# Patient Record
Sex: FEMALE | Race: WHITE | NOT HISPANIC OR LATINO
[De-identification: names, ages, dates, MRNs, and addresses within clinical notes are randomized per-mention and may not be internally consistent; named-entity substitution may affect disease eponyms.]

---

## 2020-06-01 ENCOUNTER — APPOINTMENT (OUTPATIENT)
Dept: OBGYN | Facility: CLINIC | Age: 20
End: 2020-06-01

## 2021-02-21 ENCOUNTER — APPOINTMENT (OUTPATIENT)
Dept: OBGYN | Facility: CLINIC | Age: 21
End: 2021-02-21
Payer: COMMERCIAL

## 2021-02-21 VITALS
TEMPERATURE: 96.7 F | BODY MASS INDEX: 33.9 KG/M2 | DIASTOLIC BLOOD PRESSURE: 71 MMHG | HEART RATE: 81 BPM | WEIGHT: 216 LBS | SYSTOLIC BLOOD PRESSURE: 115 MMHG | HEIGHT: 67 IN

## 2021-02-21 DIAGNOSIS — Z01.419 ENCOUNTER FOR GYNECOLOGICAL EXAMINATION (GENERAL) (ROUTINE) W/OUT ABNORMAL FINDINGS: ICD-10-CM

## 2021-02-21 DIAGNOSIS — Z78.9 OTHER SPECIFIED HEALTH STATUS: ICD-10-CM

## 2021-02-21 PROCEDURE — 99072 ADDL SUPL MATRL&STAF TM PHE: CPT

## 2021-02-21 PROCEDURE — 99385 PREV VISIT NEW AGE 18-39: CPT

## 2021-02-23 LAB — CYTOLOGY CVX/VAG DOC THIN PREP: NORMAL

## 2023-01-18 ENCOUNTER — INPATIENT (INPATIENT)
Facility: HOSPITAL | Age: 23
LOS: 3 days | Discharge: HOME | DRG: 123 | End: 2023-01-22
Attending: PSYCHIATRY & NEUROLOGY | Admitting: PSYCHIATRY & NEUROLOGY
Payer: COMMERCIAL

## 2023-01-18 VITALS
WEIGHT: 218.92 LBS | HEART RATE: 87 BPM | HEIGHT: 67 IN | SYSTOLIC BLOOD PRESSURE: 125 MMHG | TEMPERATURE: 99 F | OXYGEN SATURATION: 98 % | RESPIRATION RATE: 20 BRPM | DIASTOLIC BLOOD PRESSURE: 83 MMHG

## 2023-01-18 DIAGNOSIS — H46.9 UNSPECIFIED OPTIC NEURITIS: ICD-10-CM

## 2023-01-18 LAB
ALBUMIN SERPL ELPH-MCNC: 4.9 G/DL — SIGNIFICANT CHANGE UP (ref 3.3–5)
ALP SERPL-CCNC: 71 U/L — SIGNIFICANT CHANGE UP (ref 40–120)
ALT FLD-CCNC: 73 U/L — HIGH (ref 10–45)
ANION GAP SERPL CALC-SCNC: 12 MMOL/L — SIGNIFICANT CHANGE UP (ref 5–17)
APPEARANCE UR: CLEAR — SIGNIFICANT CHANGE UP
APTT BLD: 32.1 SEC — SIGNIFICANT CHANGE UP (ref 27.5–35.5)
AST SERPL-CCNC: 43 U/L — HIGH (ref 10–40)
BACTERIA # UR AUTO: NEGATIVE — SIGNIFICANT CHANGE UP
BASOPHILS # BLD AUTO: 0.02 K/UL — SIGNIFICANT CHANGE UP (ref 0–0.2)
BASOPHILS NFR BLD AUTO: 0.3 % — SIGNIFICANT CHANGE UP (ref 0–2)
BILIRUB SERPL-MCNC: 0.3 MG/DL — SIGNIFICANT CHANGE UP (ref 0.2–1.2)
BILIRUB UR-MCNC: NEGATIVE — SIGNIFICANT CHANGE UP
BUN SERPL-MCNC: 11 MG/DL — SIGNIFICANT CHANGE UP (ref 7–23)
CALCIUM SERPL-MCNC: 10.1 MG/DL — SIGNIFICANT CHANGE UP (ref 8.4–10.5)
CHLORIDE SERPL-SCNC: 100 MMOL/L — SIGNIFICANT CHANGE UP (ref 96–108)
CO2 SERPL-SCNC: 26 MMOL/L — SIGNIFICANT CHANGE UP (ref 22–31)
COLOR SPEC: SIGNIFICANT CHANGE UP
CREAT SERPL-MCNC: 0.72 MG/DL — SIGNIFICANT CHANGE UP (ref 0.5–1.3)
DIFF PNL FLD: ABNORMAL
EGFR: 120 ML/MIN/1.73M2 — SIGNIFICANT CHANGE UP
EOSINOPHIL # BLD AUTO: 0.06 K/UL — SIGNIFICANT CHANGE UP (ref 0–0.5)
EOSINOPHIL NFR BLD AUTO: 0.9 % — SIGNIFICANT CHANGE UP (ref 0–6)
EPI CELLS # UR: 4 /HPF — SIGNIFICANT CHANGE UP
GLUCOSE SERPL-MCNC: 79 MG/DL — SIGNIFICANT CHANGE UP (ref 70–99)
GLUCOSE UR QL: NEGATIVE — SIGNIFICANT CHANGE UP
HCT VFR BLD CALC: 41.1 % — SIGNIFICANT CHANGE UP (ref 34.5–45)
HGB BLD-MCNC: 13.6 G/DL — SIGNIFICANT CHANGE UP (ref 11.5–15.5)
HYALINE CASTS # UR AUTO: 1 /LPF — SIGNIFICANT CHANGE UP (ref 0–2)
IMM GRANULOCYTES NFR BLD AUTO: 0.3 % — SIGNIFICANT CHANGE UP (ref 0–0.9)
INR BLD: 1.13 RATIO — SIGNIFICANT CHANGE UP (ref 0.88–1.16)
KETONES UR-MCNC: SIGNIFICANT CHANGE UP
LEUKOCYTE ESTERASE UR-ACNC: NEGATIVE — SIGNIFICANT CHANGE UP
LYMPHOCYTES # BLD AUTO: 3.63 K/UL — HIGH (ref 1–3.3)
LYMPHOCYTES # BLD AUTO: 52.8 % — HIGH (ref 13–44)
MCHC RBC-ENTMCNC: 30.6 PG — SIGNIFICANT CHANGE UP (ref 27–34)
MCHC RBC-ENTMCNC: 33.1 GM/DL — SIGNIFICANT CHANGE UP (ref 32–36)
MCV RBC AUTO: 92.4 FL — SIGNIFICANT CHANGE UP (ref 80–100)
MONOCYTES # BLD AUTO: 0.29 K/UL — SIGNIFICANT CHANGE UP (ref 0–0.9)
MONOCYTES NFR BLD AUTO: 4.2 % — SIGNIFICANT CHANGE UP (ref 2–14)
NEUTROPHILS # BLD AUTO: 2.85 K/UL — SIGNIFICANT CHANGE UP (ref 1.8–7.4)
NEUTROPHILS NFR BLD AUTO: 41.5 % — LOW (ref 43–77)
NITRITE UR-MCNC: NEGATIVE — SIGNIFICANT CHANGE UP
NRBC # BLD: 0 /100 WBCS — SIGNIFICANT CHANGE UP (ref 0–0)
PH UR: 5.5 — SIGNIFICANT CHANGE UP (ref 5–8)
PLATELET # BLD AUTO: 254 K/UL — SIGNIFICANT CHANGE UP (ref 150–400)
POTASSIUM SERPL-MCNC: 3.9 MMOL/L — SIGNIFICANT CHANGE UP (ref 3.5–5.3)
POTASSIUM SERPL-SCNC: 3.9 MMOL/L — SIGNIFICANT CHANGE UP (ref 3.5–5.3)
PROT SERPL-MCNC: 7.9 G/DL — SIGNIFICANT CHANGE UP (ref 6–8.3)
PROT UR-MCNC: NEGATIVE — SIGNIFICANT CHANGE UP
PROTHROM AB SERPL-ACNC: 13.1 SEC — SIGNIFICANT CHANGE UP (ref 10.5–13.4)
RAPID RVP RESULT: SIGNIFICANT CHANGE UP
RBC # BLD: 4.45 M/UL — SIGNIFICANT CHANGE UP (ref 3.8–5.2)
RBC # FLD: 11.6 % — SIGNIFICANT CHANGE UP (ref 10.3–14.5)
RBC CASTS # UR COMP ASSIST: 7 /HPF — HIGH (ref 0–4)
SARS-COV-2 RNA SPEC QL NAA+PROBE: SIGNIFICANT CHANGE UP
SODIUM SERPL-SCNC: 138 MMOL/L — SIGNIFICANT CHANGE UP (ref 135–145)
SP GR SPEC: 1.01 — SIGNIFICANT CHANGE UP (ref 1.01–1.02)
UROBILINOGEN FLD QL: NEGATIVE — SIGNIFICANT CHANGE UP
WBC # BLD: 6.87 K/UL — SIGNIFICANT CHANGE UP (ref 3.8–10.5)
WBC # FLD AUTO: 6.87 K/UL — SIGNIFICANT CHANGE UP (ref 3.8–10.5)
WBC UR QL: 1 /HPF — SIGNIFICANT CHANGE UP (ref 0–5)

## 2023-01-18 PROCEDURE — 70450 CT HEAD/BRAIN W/O DYE: CPT | Mod: 26,MA

## 2023-01-18 PROCEDURE — 71045 X-RAY EXAM CHEST 1 VIEW: CPT | Mod: 26

## 2023-01-18 PROCEDURE — 99285 EMERGENCY DEPT VISIT HI MDM: CPT

## 2023-01-18 RX ORDER — PANTOPRAZOLE SODIUM 20 MG/1
40 TABLET, DELAYED RELEASE ORAL
Refills: 0 | Status: DISCONTINUED | OUTPATIENT
Start: 2023-01-18 | End: 2023-01-22

## 2023-01-18 RX ADMIN — Medication 58 MILLIGRAM(S): at 23:24

## 2023-01-18 NOTE — ED PROVIDER NOTE - RAPID ASSESSMENT
22y F was sent in by ophthalmologist (Dr. Neri Patiño) for optic neuritis diagnosed today via MRI. Reports having L eye blurriness and intermittent HA since January 1st. Of note pt had covid at the end of December. Pt is well appearing in triage.     Helena RAMÍREZ (Nithya) have documented this rapid assessment note under the dictation of Savage Chandler) which has been reviewed and affirmed to be accurate. Patient was seen as a QDOC patient. The patient will be seen and further worked up in the main emergency department and their care will be completed by the main emergency department team along with a thorough physical exam. Receiving team will follow up on labs, analgesia, any clinical imaging, reassess and disposition as clinically indicated, all decisions regarding the progression of care will be made at their discretion. 22y F was sent in by ophthalmologist (Dr. Neri Patiño) for optic neuritis diagnosed today via MRI. Reports having L eye blurriness and intermittent HA since January 1st. Of note pt had covid at the end of December. Pt is well appearing in triage.     Helena RAMÍREZ (Scribe) have documented this rapid assessment note under the dictation of Savage Chandler (MD) which has been reviewed and affirmed to be accurate. Patient was seen as a QDOC patient. The patient will be seen and further worked up in the main emergency department and their care will be completed by the main emergency department team along with a thorough physical exam. Receiving team will follow up on labs, analgesia, any clinical imaging, reassess and disposition as clinically indicated, all decisions regarding the progression of care will be made at their discretion.    PT seen as Triag/Qdoc with scribe full evaluation to be performed once pt is transferred to main ED  Savage Chandler MD, Facep

## 2023-01-18 NOTE — ED ADULT TRIAGE NOTE - CHIEF COMPLAINT QUOTE
sent by ophthalmologist for optic neuritis diagnosed today via MRI. Pt states "I have L eye blurriness since I had covid end of December." Also with intermittent headache

## 2023-01-18 NOTE — H&P ADULT - ASSESSMENT
Impression:   Left eye pain with decreased vision 2/2 to Optic Neuritis  recommendation:   Neurology   IV solumedrol 1g Qday for 5 days   Optho consulted in the ED   LP in the AM   CSF studies as per in inflammatory, Autoimmune etiology   Neuro checks q4hrs     Pulm   on Ra     Cardio  keep normoteksive     GI   regular diet   Protonix 40mg qday     Heme  monitor CBC  SCDS    Renal   monitor bmp      Endo  TSH, hgba 1c  monitor BG- as patient is on IV steriods     ID   no acute issues     Case discussed with Dr. Milton. Case to be seen with Dr. Woo.

## 2023-01-18 NOTE — ED PROVIDER NOTE - PROGRESS NOTE DETAILS
Vinny, PGY4 - ophtho & neuro consulted, rory shipley pt. Neuro agrees to starting pt on Solumedrol now. MRI Brain and orbits with and without IV contrast performed today 1/18/23 confirms abnormal signal and enhancement within the left optic nerve concerning for optic neuritis. Ophtho Dr. Neri Patiño. Pt does not know her PCP's name Vinny, PGY4 - ophtho & neuro consulted, rory shipley pt. Neuro agrees to starting pt on Solumedrol now. MRI Brain and orbits with and without IV contrast performed today 1/18/23 confirms abnormal signal and enhancement within the left optic nerve concerning for optic neuritis. Ophtho Dr. Neri Patiño. Pt does not know her PCP's name    External appearance: OD no discharge or conjunctival injection; OS no discharge or conjunctival injection  Pupils: OD round and reactive; OS round and reactive  Extraocular movements: OD intact; OS intact  Visual Fields: OD intact x 4; OS L superior visual field defect  Visual Acuity: OD 20/20; OS 20/40  Lids/Lashes/Lacrimal: OD no lesions; OS no lesions  Cornea: OD no fluorescein uptake; OS no fluorescein uptake  Conjunctiva & Sclera: OD white; OS white  Intraocular pressure: OD 13 ; OS 11 Vinny, PGY4 - pt seen by neuro, states can start solumedrol and admit to neuro service at this time. LP to be performed inpatient tomorrow AM

## 2023-01-18 NOTE — H&P ADULT - ATTENDING COMMENTS
Seen and examined with residents    22f   developed blurred vision OS after COVID  some pain  saw ophtho and had an MRI showing enhancement on left ON (by report)  s/p steroids 2 doses  vision stable  no other neurologic symptoms  history of "brain lesion" on prior scan by report    On exam she has pharmacologically dilated pupils (by ophto exam here there is RAPD on left but no disc edema)  visual fields with decreased inferior nasal field OS  otherwise normal exam    likely ON  contine steroids x5d then oral taper  MRI C and T spine  serologies including NMO/MOG  hold on LP for now  asked her to get her MRI brain from yesterday or will need to repeat

## 2023-01-18 NOTE — ED PROVIDER NOTE - CLINICAL SUMMARY MEDICAL DECISION MAKING FREE TEXT BOX
Attending note.  Patient with optic neuritis found on MRI this morning and with complaint of left eye pain and blurred vision since January 1.  Ophthalmology consultation, neurology consultation and possible steroid infusion.

## 2023-01-18 NOTE — ED ADULT NURSE NOTE - OBJECTIVE STATEMENT
22y female A&OX4 coming in through triage complaining of optic neuritis. No PMHX. Pt repots having Left eye blurry vision on 1/1/23 with pressure to eye. Pt follow PCP and was recommend to get MRI and pt went to MRI this morning and was told this afternoon to go to the ER for follow up. Pt denies eye pressure, headache, dizziness, chest pain, shortness of breath, abd pain, fever, cough, N/V/D. Labs was drawn and sent to lab. Pt awaiting ct scan. Pt pending dispo.

## 2023-01-18 NOTE — ED PROVIDER NOTE - OBJECTIVE STATEMENT
Attending note.  Patient was seen in blue eye room.  Patient had an MRI performed this morning which showed optic neuritis in the left eye.  Patient became sick with COVID on December 26 with sore throat nasal congestion cough and fever which lasted a few days.  Patient developed blurred vision in the left eye on January 1 with mild left-sided headache and pain around the left eye.  Patient was seen by ophthalmology on January 12.  Patient had MRI today and was called with report.  She denies any other neurologic symptoms.  She reports no significant past medical history, takes no medications and has no allergies.  She does not wear corrective lenses.

## 2023-01-18 NOTE — ED PROVIDER NOTE - PHYSICAL EXAMINATION
Attending note.  Patient is alert and in no acute distress.  Pupils are 3 mm equal and reactive.  There is no APD.  Extraocular muscles are intact.  Neurologic exam including cranial nerves, motor, sensory, cerebellar and speech are intact and nonfocal.

## 2023-01-18 NOTE — H&P ADULT - NSHPLABSRESULTS_GEN_ALL_CORE
Labs:   cbc                      13.6   6.87  )-----------( 254      ( 2023 20:51 )             41.1     [id number]    138  |  100  |  11  ----------------------------<  79  3.9   |  26  |  0.72    Ca    10.1      2023 20:51    TPro  7.9  /  Alb  4.9  /  TBili  0.3  /  DBili  x   /  AST  43<H>  /  ALT  73<H>  /  AlkPhos  71  01-18    CoagsPT/INR - ( 2023 20:51 )   PT: 13.1 sec;   INR: 1.13 ratio         PTT - ( 2023 20:51 )  PTT:32.1 sec    LFTsLIVER FUNCTIONS - ( 2023 20:51 )  Alb: 4.9 g/dL / Pro: 7.9 g/dL / ALK PHOS: 71 U/L / ALT: 73 U/L / AST: 43 U/L / GGT: x           UAUrinalysis Basic - ( 2023 21:25 )    Color: Light Yellow / Appearance: Clear / S.010 / pH: x  Gluc: x / Ketone: Trace  / Bili: Negative / Urobili: Negative   Blood: x / Protein: Negative / Nitrite: Negative   Leuk Esterase: Negative / RBC: 7 /hpf / WBC 1 /HPF   Sq Epi: x / Non Sq Epi: 4 /hpf / Bacteria: Negative        Radiology:  MRI head and orbits:   MRI brain and orbits results stating abnormal enhancement of the left optic nerve in the intracanicular and intracranial segments of the nerve  0.6cm left parietal lobe lesion..

## 2023-01-18 NOTE — H&P ADULT - NSHPPHYSICALEXAM_GEN_ALL_CORE
Vital Signs Last 24 Hrs  T(C): 36.7 (18 Jan 2023 20:52), Max: 37.1 (18 Jan 2023 16:46)  T(F): 98.1 (18 Jan 2023 20:52), Max: 98.7 (18 Jan 2023 16:46)  HR: 93 (18 Jan 2023 20:52) (87 - 93)  BP: 123/77 (18 Jan 2023 20:52) (123/77 - 125/83)  BP(mean): --  RR: 18 (18 Jan 2023 20:52) (18 - 20)  SpO2: 97% (18 Jan 2023 20:52) (97% - 98%)    Parameters below as of 18 Jan 2023 20:52  Patient On (Oxygen Delivery Method): room air        GENERAL EXAM:  Constitutional: awake and alert. NAD  HEENT: PERRLA, EOMI  Neck: Supple  Respiratory: Breath sounds are clear bilaterally  Cardiovascular: S1 and S2, regular / irregular rhythm  Gastrointestinal: soft, nontender  Extremities: no edema, no cyanosis  Vascular: no carotid bruits  Musculoskeletal: no joint swelling/tenderness, no abnormal movements  Skin: no rashes    NEUROLOGICAL EXAM:  MS: AAOX3, fluent, attends b/l; recent and remote memory intact; normal attention, language and fund of knowledge.   CN: EOMI, PERRL,. Decreased vison in the LEFT- pain on movement. 20/50. Red color desaturation abnormalities noted.     V1-3 intact, no facial asymmetry, t/p midline,   Motor: Strength: 5/5 4x.   Tone: normal. Bulk: normal.   DTR 2+ symm.    Plantar flex b/l.   Sensation: intact  4x.   Coordination: intact 4x.   Gait:  Romberg negative, pull test negative; walks with narrow base, pivots in 2 steps.

## 2023-01-18 NOTE — H&P ADULT - HISTORY OF PRESENT ILLNESS
MRN-[id number]  Patient is a 22y old  Female who presents with a chief complaint of   HPI:  Patient is 23yo F with  pmh  of  hx of covid, headaches presents to ED for optic neuritis treatment. Patient was sent in by her Ophthalmologist Dr. Neri Patiño upon MRI brain and orbits results stating abnormal enhancement of the left optic nerve in the intracanicular and intracranial segments of the nerve. Patient states the symptoms started around 1/1/2023 with pain on moving her left eye. Patient states there is decreased vision in the Left eye as well. Patient states its like a thin film covering her left eye. Patient of note saw a Neurologist last year Dr. Graff, for tremors and was noted to have 0.6cm left parietal lobe lesion. Patient denies fever, chills. numbness and tingling.     PAST MEDICAL & SURGICAL HISTORY:  No pertinent past medical history        FAMILY HISTORY:    Social Hx:  Nonsmoker, no drug or alcohol use    Home Medications:    MEDICATIONS  (STANDING):  pantoprazole    Tablet 40 milliGRAM(s) Oral before breakfast    MEDICATIONS  (PRN):    Allergies  No Known Allergies    Intolerances      REVIEW OF SYSTEMS  General:	  Skin/Breast:	  Ophthalmologic:  ENMT:	  Respiratory and Thorax:	  Cardiovascular:	  Gastrointestinal:	  Genitourinary:	  Musculoskeletal:	  Neurological:	  Psychiatric:	  Hematology/Lymphatics:	  Endocrine:	  Allergic/Immunologic:	    ROS: Pertinent positives in HPI, all other ROS were reviewed and are negative.       X Size Of Lesion In Cm (Optional): 0

## 2023-01-19 LAB
24R-OH-CALCIDIOL SERPL-MCNC: 31 NG/ML — SIGNIFICANT CHANGE UP (ref 30–80)
A1C WITH ESTIMATED AVERAGE GLUCOSE RESULT: 5.4 % — SIGNIFICANT CHANGE UP (ref 4–5.6)
CHOLEST SERPL-MCNC: 253 MG/DL — HIGH
ERYTHROCYTE [SEDIMENTATION RATE] IN BLOOD: 3 MM/HR — SIGNIFICANT CHANGE UP (ref 0–15)
ESTIMATED AVERAGE GLUCOSE: 108 MG/DL — SIGNIFICANT CHANGE UP (ref 68–114)
FOLATE SERPL-MCNC: 7.8 NG/ML — SIGNIFICANT CHANGE UP
HDLC SERPL-MCNC: 35 MG/DL — LOW
LIPID PNL WITH DIRECT LDL SERPL: 204 MG/DL — HIGH
NON HDL CHOLESTEROL: 218 MG/DL — HIGH
T3 SERPL-MCNC: 96 NG/DL — SIGNIFICANT CHANGE UP (ref 80–200)
T4 AB SER-ACNC: 7.9 UG/DL — SIGNIFICANT CHANGE UP (ref 4.6–12)
TRIGL SERPL-MCNC: 66 MG/DL — SIGNIFICANT CHANGE UP
TSH SERPL-MCNC: 0.83 UIU/ML — SIGNIFICANT CHANGE UP (ref 0.27–4.2)
VIT B12 SERPL-MCNC: 692 PG/ML — SIGNIFICANT CHANGE UP (ref 232–1245)

## 2023-01-19 PROCEDURE — 72157 MRI CHEST SPINE W/O & W/DYE: CPT | Mod: 26

## 2023-01-19 PROCEDURE — 72156 MRI NECK SPINE W/O & W/DYE: CPT | Mod: 26

## 2023-01-19 PROCEDURE — 99223 1ST HOSP IP/OBS HIGH 75: CPT

## 2023-01-19 RX ORDER — ENOXAPARIN SODIUM 100 MG/ML
40 INJECTION SUBCUTANEOUS EVERY 24 HOURS
Refills: 0 | Status: DISCONTINUED | OUTPATIENT
Start: 2023-01-19 | End: 2023-01-22

## 2023-01-19 RX ADMIN — PANTOPRAZOLE SODIUM 40 MILLIGRAM(S): 20 TABLET, DELAYED RELEASE ORAL at 06:15

## 2023-01-19 RX ADMIN — Medication 58 MILLIGRAM(S): at 06:14

## 2023-01-19 RX ADMIN — ENOXAPARIN SODIUM 40 MILLIGRAM(S): 100 INJECTION SUBCUTANEOUS at 17:14

## 2023-01-19 NOTE — CONSULT NOTE ADULT - ASSESSMENT
Assessment and Recommendations:  22y female with a past medical history/ocular history of Covid and headaches consulted for optic neuritis treatment, found to be improving on steroids. Patient with vision 20/20 right eye, 20/30 left eye, pupils equal, round and reactive trace left APD, intraocular pressure within normal limits, extraocular movements, and confrontational visual fields, and color full both eyes. Dilated fundus exam within normal limits.     Optic neuritis OS   - Hx, outpatient exam, and MRI very suggestive of optic neuritis   - Staill has reduced vision OS and Trace APD OS on exam today   - Agree with steroids, 1g solumedrol for 3 days and then oral pred 1mg/kg/day for 11 days.    - Agree with LP for CSF studies   - Would need additional imaging for MS diagnosis, MRI cervical and thoracic.     Will discuss with Dr. Montgomery in the AM    Outpatient Follow-up: Patient should follow-up with his/her ophthalmologist or with [facility] Department of Ophthalmology within 1 week of after discharge at:    [address]  [place] [address]  [phone]    Oscar Merritt MD, PGY-2  Also available on [employer] Teams     Assessment and Recommendations:  22y female with a past medical history/ocular history of Covid and headaches consulted for optic neuritis treatment, found to be improving on steroids. Patient with vision 20/20 right eye, 20/30 left eye, pupils equal, round and reactive trace left APD, intraocular pressure within normal limits, extraocular movements, and confrontational visual fields, and color full both eyes. Dilated fundus exam within normal limits.     Optic neuritis OS   - Hx, outpatient exam, and MRI very suggestive of optic neuritis   - Still has reduced vision OS and Trace APD OS on exam today   - Color plates full, slight discomfort with eye movements   - Agree with steroids, 1g solumedrol for 3 days and then oral pred 1mg/kg/day for 11 days.    - Agree with LP for CSF studies   - Would need additional imaging for MS diagnosis, MRI cervical and thoracic.     Will discuss with Dr. Montgomery in the AM    Outpatient Follow-up: Patient should follow-up with his/her ophthalmologist or with [facility] Department of Ophthalmology within 1 week of after discharge at:    [address]  [place] [address]  [phone]    Oscar Merritt MD, PGY-2  Also available on [employer] Teams     Assessment and Recommendations:  22y female with a past medical history/ocular history of Covid and headaches consulted for optic neuritis treatment, found to be improving on steroids. Patient with vision 20/20 right eye, 20/30 left eye, pupils equal, round and reactive trace left APD, intraocular pressure within normal limits, extraocular movements, and confrontational visual fields, and color full both eyes. Dilated fundus exam within normal limits.     Optic neuritis OS   - Hx, outpatient exam, and MRI very suggestive of optic neuritis   - Still has reduced vision OS and Trace APD OS on exam today   - Color plates full, slight discomfort with eye movements   - Agree with steroids, 1g solumedrol for 3 days and then oral pred 1mg/kg/day for 11 days.    - Agree with LP for CSF studies   - Please send Serum and CSF MOG and NMO antibodies  - Appreciate further neurology recs   - Would need additional imaging for MS diagnosis, MRI cervical and thoracic.     Discussed with Dr. Montgomery    Outpatient Follow-up: Patient should follow-up with his/her ophthalmologist or with [facility] Department of Ophthalmology within 1 week of after discharge at:    [address]  [place] [address]  [phone]    Oscar Merritt MD, PGY-2  Also available on [employer] Teams

## 2023-01-19 NOTE — PROGRESS NOTE ADULT - SUBJECTIVE AND OBJECTIVE BOX
[facility] DEPARTMENT OF OPHTHALMOLOGY  ------------------------------------------------------------------------------  Jacobo Marsh MD PGY-3  Pager: [phone], also available on teams  ------------------------------------------------------------------------------    Interval History: No acute events overnight. Pt endorse sl improvement in vision right after IV solumedrol.     MEDICATIONS  (STANDING):  enoxaparin Injectable 40 milliGRAM(s) SubCutaneous every 24 hours  methylPREDNISolone sodium succinate IVPB 1000 milliGRAM(s) IV Intermittent daily  pantoprazole    Tablet 40 milliGRAM(s) Oral before breakfast    MEDICATIONS  (PRN):    VITALS: T(C): 37.1 (01-19-23 @ 15:34)  T(F): 98.7 (01-19-23 @ 15:34), Max: 98.7 (01-19-23 @ 12:46)  HR: 87 (01-19-23 @ 15:34) (73 - 93)  BP: 118/70 (01-19-23 @ 15:34) (98/60 - 123/77)  RR:  (18 - 18)  SpO2:  (96% - 99%)  General: AAO x 3, appropriate mood and affect    Ophthalmology Exam:  Visual acuity (sc): 20/20 OD; 20/30+ OS  Pupils: PERRL OU, Trace APD OS  Extraocular movements (EOMs): Full OU, slight discomfort with EMs, no diplopia  Confrontational Visual Field (CVF): Full OU  Color Plates: 12/12 OS  Red desaturation OS 70% of OD    Pen Light Exam (PLE)  External: Flat OU  Lids/Lashes/Lacrimal Ducts: Flat OU    Sclera/Conjunctiva: W+Q OU  Cornea: Cl OU  Anterior Chamber: D+F OU    Iris: Flat OU  Lens: Cl OU    Fundus Exam: dilated with 1% tropicamide and 2.5% phenylephrine  Approval obtained from primary team for dilation  Patient aware that pupils can remained dilated for at least 4-6 hours  Exam performed with 20D lens    Vitreous: wnl OU  Disc, cup/disc: sharp and pink, 0.5 OU  Macula: wnl OU  Vessels: wnl OU  Periphery: wnl OU    Labs/Imaging:  Outpatient MRI showing " stating abnormal enhancement of the left optic nerve in the intracanicular and intracranial segments of the nerve"     Outpatient Imaging 1/12/23  HVF 24-2: Full OD; OD unreliable patchy sup>inf defects  OCT ON: Full OU

## 2023-01-19 NOTE — PROGRESS NOTE ADULT - ASSESSMENT
22y female with a past medical history/ocular history of Covid and headaches consulted for optic neuritis treatment, found to be improving on steroids. Patient with vision 20/20 right eye, 20/30 left eye, pupils equal, round and reactive trace left APD, intraocular pressure within normal limits, extraocular movements, and confrontational visual fields, and color full both eyes. Dilated fundus exam within normal limits.     #Likely optic neuritis OS   - Hx, outpatient exam, and MRI very suggestive of optic neuritis   - Still has reduced vision OS and Trace APD OS on exam today, stable.   - Color plates full, whoever does have mild red desaturation   - Agree with steroids, 1g solumedrol for 3 days and then oral pred 1mg/kg/day for 11 days.    - Agree with LP for CSF studies   - Please send Serum and CSF MOG and NMO antibodies  - Appreciate further neurology recs   - Would need additional imaging for MS diagnosis, MRI cervical and thoracic.     Discussed with Dr. Montgomery    Outpatient Follow-up: Patient should follow-up with his/her ophthalmologist or with [facility] Department of Ophthalmology within 1 week of after discharge at:    [address]  [place] [address]  [phone]

## 2023-01-19 NOTE — CONSULT NOTE ADULT - SUBJECTIVE AND OBJECTIVE BOX
[facility] DEPARTMENT OF OPHTHALMOLOGY - INITIAL ADULT CONSULT  -----------------------------------------------------------------------------------------------------------------  Oscar Merritt MD  PGY 2  Contact on Teams  -----------------------------------------------------------------------------------------------------------------    HPI:  [id number]  Patient is a 22y old  Female who presents with a chief complaint of   HPI:  Patient is 23yo F with  pmh  of  hx of covid, headaches presents to ED for optic neuritis treatment. Patient was sent in by her Ophthalmologist Dr. Neri Patiño upon MRI brain and orbits results stating abnormal enhancement of the left optic nerve in the intracanicular and intracranial segments of the nerve. Patient states the symptoms started around 1/1/2023 with pain on moving her left eye. Patient states there is decreased vision in the Left eye as well. Patient states its like a thin film covering her left eye. Patient of note saw a Neurologist last year Dr. Graff, for tremors and was noted to have 0.6cm left parietal lobe lesion. Patient denies fever, chills. numbness and tingling.     PAST MEDICAL & SURGICAL HISTORY:  No pertinent past medical history      Interval History: ophtho consulted to assess baseline in optic neuritis patient. By time of exam, patient was s/p one dose of solumedrol. Patient reports that she feels less pain with eye movents and vison felt like it was improving.    PAST MEDICAL & SURGICAL HISTORY:  No pertinent past medical history        Past Ocular History: None  Ophthalmic Medications: None  FAMILY HISTORY:      MEDICATIONS  (STANDING):  methylPREDNISolone sodium succinate IVPB 1000 milliGRAM(s) IV Intermittent daily  pantoprazole    Tablet 40 milliGRAM(s) Oral before breakfast    MEDICATIONS  (PRN):    Allergies & Intolerances:     Review of Systems:  Constitutional: No fever, chills  Eyes: + blurry vision OS; No flashes, floaters, FBS, erythema, discharge, double vision, OU  Neuro: No tremors  Cardiovascular: No chest pain, palpitations  Respiratory: No SOB, no cough  GI: No nausea, vomiting, abdominal pain  : No dysuria  Skin: no rash  Psych: no depression  Endocrine: no polyuria, polydipsia  Heme/lymph: no swelling    VITALS: T(C): 36.6 (01-19-23 @ 00:04)  T(F): 97.9 (01-19-23 @ 00:04), Max: 98.7 (01-18-23 @ 16:46)  HR: 73 (01-19-23 @ 00:04) (73 - 93)  BP: 120/78 (01-19-23 @ 00:04) (120/78 - 125/83)  RR:  (18 - 20)  SpO2:  (97% - 98%)  Wt(kg): --  General: AAO x 3, appropriate mood and affect    Ophthalmology Exam:  Visual acuity (sc): 20/20 OD; 20/30 OS  Pupils: PERRL OU, Trace APD OS  Ttono: 14 OU  Extraocular movements (EOMs): Full OU, slight discomfort with EMs, no diplopia  Confrontational Visual Field (CVF): Full OU  Color Plates: 12/12 OU    Pen Light Exam (PLE)  External: Flat OU  Lids/Lashes/Lacrimal Ducts: Flat OU    Sclera/Conjunctiva: W+Q OU  Cornea: Cl OU  Anterior Chamber: D+F OU    Iris: Flat OU  Lens: Cl OU    Fundus Exam: dilated with 1% tropicamide and 2.5% phenylephrine  Approval obtained from primary team for dilation  Patient aware that pupils can remained dilated for at least 4-6 hours  Exam performed with 20D lens    Vitreous: wnl OU  Disc, cup/disc: sharp and pink, 0.5 OU  Macula: wnl OU  Vessels: wnl OU  Periphery: wnl OU    Labs/Imaging:  Outpatient MRI showing " stating abnormal enhancement of the left optic nerve in the intracanicular and intracranial segments of the nerve"     was not able to review MRI myself at this time.    [facility] DEPARTMENT OF OPHTHALMOLOGY - INITIAL ADULT CONSULT  -----------------------------------------------------------------------------------------------------------------  Oscar Merritt MD  PGY 2  Contact on Teams  -----------------------------------------------------------------------------------------------------------------    HPI:  [id number]  Patient is a 22y old  Female who presents with a chief complaint of   HPI:  Patient is 21yo F with  pmh  of  hx of covid, headaches presents to ED for optic neuritis treatment. Patient was sent in by her Ophthalmologist Dr. Neri Patiño upon MRI brain and orbits results stating abnormal enhancement of the left optic nerve in the intracanicular and intracranial segments of the nerve. Patient states the symptoms started around 1/1/2023 with pain on moving her left eye. Patient states there is decreased vision in the Left eye as well. Patient states its like a thin film covering her left eye. Patient of note saw a Neurologist last year Dr. Graff, for tremors and was noted to have 0.6cm left parietal lobe lesion. Patient denies fever, chills. numbness and tingling.     PAST MEDICAL & SURGICAL HISTORY:  No pertinent past medical history      Interval History: ophtho consulted to assess baseline in optic neuritis patient. By time of exam, patient was s/p one dose of solumedrol. Patient reports that she feels less pain with eye movents and vison felt like it was improving.    PAST MEDICAL & SURGICAL HISTORY:  No pertinent past medical history        Past Ocular History: None  Ophthalmic Medications: None  FAMILY HISTORY:      MEDICATIONS  (STANDING):  methylPREDNISolone sodium succinate IVPB 1000 milliGRAM(s) IV Intermittent daily  pantoprazole    Tablet 40 milliGRAM(s) Oral before breakfast    MEDICATIONS  (PRN):    Allergies & Intolerances:     Review of Systems:  Constitutional: No fever, chills  Eyes: + blurry vision OS; No flashes, floaters, FBS, erythema, discharge, double vision, OU  Neuro: No tremors  Cardiovascular: No chest pain, palpitations  Respiratory: No SOB, no cough  GI: No nausea, vomiting, abdominal pain  : No dysuria  Skin: no rash  Psych: no depression  Endocrine: no polyuria, polydipsia  Heme/lymph: no swelling    VITALS: T(C): 36.6 (01-19-23 @ 00:04)  T(F): 97.9 (01-19-23 @ 00:04), Max: 98.7 (01-18-23 @ 16:46)  HR: 73 (01-19-23 @ 00:04) (73 - 93)  BP: 120/78 (01-19-23 @ 00:04) (120/78 - 125/83)  RR:  (18 - 20)  SpO2:  (97% - 98%)  Wt(kg): --  General: AAO x 3, appropriate mood and affect    Ophthalmology Exam:  Visual acuity (sc): 20/20 OD; 20/30 OS  Pupils: PERRL OU, Trace APD OS  Ttono: 14 OU  Extraocular movements (EOMs): Full OU, slight discomfort with EMs, no diplopia  Confrontational Visual Field (CVF): Full OU  Color Plates: 12/12 OU    Pen Light Exam (PLE)  External: Flat OU  Lids/Lashes/Lacrimal Ducts: Flat OU    Sclera/Conjunctiva: W+Q OU  Cornea: Cl OU  Anterior Chamber: D+F OU    Iris: Flat OU  Lens: Cl OU    Fundus Exam: dilated with 1% tropicamide and 2.5% phenylephrine  Approval obtained from primary team for dilation  Patient aware that pupils can remained dilated for at least 4-6 hours  Exam performed with 20D lens    Vitreous: wnl OU  Disc, cup/disc: sharp and pink, 0.5 OU  Macula: wnl OU  Vessels: wnl OU  Periphery: wnl OU    Labs/Imaging:  Outpatient MRI showing " stating abnormal enhancement of the left optic nerve in the intracanicular and intracranial segments of the nerve"     was not able to review MRI images myself at this time, but have reviewed impression by Dr. Devan Ya  Outpatient Imaging:  HVF 24-2: Full OD; unreliable patchy sup>inf defects  OCT ON: Full OU

## 2023-01-20 ENCOUNTER — TRANSCRIPTION ENCOUNTER (OUTPATIENT)
Age: 23
End: 2023-01-20

## 2023-01-20 LAB
ANION GAP SERPL CALC-SCNC: 14 MMOL/L — SIGNIFICANT CHANGE UP (ref 5–17)
BUN SERPL-MCNC: 13 MG/DL — SIGNIFICANT CHANGE UP (ref 7–23)
CALCIUM SERPL-MCNC: 9.8 MG/DL — SIGNIFICANT CHANGE UP (ref 8.4–10.5)
CHLORIDE SERPL-SCNC: 104 MMOL/L — SIGNIFICANT CHANGE UP (ref 96–108)
CO2 SERPL-SCNC: 21 MMOL/L — LOW (ref 22–31)
CREAT SERPL-MCNC: 0.55 MG/DL — SIGNIFICANT CHANGE UP (ref 0.5–1.3)
DSDNA AB SER-ACNC: <12 IU/ML — SIGNIFICANT CHANGE UP
EGFR: 132 ML/MIN/1.73M2 — SIGNIFICANT CHANGE UP
GLUCOSE SERPL-MCNC: 150 MG/DL — HIGH (ref 70–99)
MAGNESIUM SERPL-MCNC: 2.1 MG/DL — SIGNIFICANT CHANGE UP (ref 1.6–2.6)
PHOSPHATE SERPL-MCNC: 3.7 MG/DL — SIGNIFICANT CHANGE UP (ref 2.5–4.5)
POTASSIUM SERPL-MCNC: 4 MMOL/L — SIGNIFICANT CHANGE UP (ref 3.5–5.3)
POTASSIUM SERPL-SCNC: 4 MMOL/L — SIGNIFICANT CHANGE UP (ref 3.5–5.3)
SODIUM SERPL-SCNC: 139 MMOL/L — SIGNIFICANT CHANGE UP (ref 135–145)
THYROPEROXIDASE AB SERPL-ACNC: <10 IU/ML — SIGNIFICANT CHANGE UP

## 2023-01-20 PROCEDURE — 99232 SBSQ HOSP IP/OBS MODERATE 35: CPT

## 2023-01-20 PROCEDURE — 99233 SBSQ HOSP IP/OBS HIGH 50: CPT

## 2023-01-20 RX ORDER — SENNA PLUS 8.6 MG/1
2 TABLET ORAL AT BEDTIME
Refills: 0 | Status: DISCONTINUED | OUTPATIENT
Start: 2023-01-20 | End: 2023-01-22

## 2023-01-20 RX ORDER — ACETAMINOPHEN 500 MG
650 TABLET ORAL EVERY 6 HOURS
Refills: 0 | Status: DISCONTINUED | OUTPATIENT
Start: 2023-01-20 | End: 2023-01-22

## 2023-01-20 RX ORDER — PSYLLIUM SEED (WITH DEXTROSE)
1 POWDER (GRAM) ORAL DAILY
Refills: 0 | Status: DISCONTINUED | OUTPATIENT
Start: 2023-01-20 | End: 2023-01-22

## 2023-01-20 RX ORDER — DIPHENHYDRAMINE HCL 50 MG
25 CAPSULE ORAL ONCE
Refills: 0 | Status: COMPLETED | OUTPATIENT
Start: 2023-01-20 | End: 2023-01-20

## 2023-01-20 RX ADMIN — Medication 1 PACKET(S): at 22:34

## 2023-01-20 RX ADMIN — PANTOPRAZOLE SODIUM 40 MILLIGRAM(S): 20 TABLET, DELAYED RELEASE ORAL at 06:07

## 2023-01-20 RX ADMIN — Medication 58 MILLIGRAM(S): at 06:07

## 2023-01-20 RX ADMIN — Medication 650 MILLIGRAM(S): at 23:08

## 2023-01-20 RX ADMIN — Medication 25 MILLIGRAM(S): at 23:06

## 2023-01-20 RX ADMIN — ENOXAPARIN SODIUM 40 MILLIGRAM(S): 100 INJECTION SUBCUTANEOUS at 18:43

## 2023-01-20 NOTE — DISCHARGE NOTE PROVIDER - NSDCCPCAREPLAN_GEN_ALL_CORE_FT
PRINCIPAL DISCHARGE DIAGNOSIS  Diagnosis: Left optic neuritis  Assessment and Plan of Treatment: - Please set up appointment with Dr. De Los Santos at [address] [place] [address] - [phone] - MAKE SURE TO MENTION HER SPECIFICALLY AND THAT THE APPOINTMENT IS FOR OPTIC NEURITIS/POSSIBLE MS

## 2023-01-20 NOTE — DISCHARGE NOTE PROVIDER - HOSPITAL COURSE
HPI:  Patient is 23yo F with  pmh  of  hx of covid, headaches presents to ED for optic neuritis treatment. Patient was sent in by her Ophthalmologist Dr. Neri Patiño upon MRI brain and orbits results stating abnormal enhancement of the left optic nerve in the intracanicular and intracranial segments of the nerve. Patient states the symptoms started around 1/1/2023 with pain on moving her left eye. Patient states there is decreased vision in the Left eye as well. Patient states its like a thin film covering her left eye. Patient of note saw a Neurologist last year Dr. Graff, for tremors and was noted to have 0.6cm left parietal lobe lesion.     Hospital Course   Patient was admitted to the neurology service for IV solumedrol x 5 days. Patient tolerated the treatment well and she had improvement of her symptoms. Patient outpatient MRI brain and orbits were reviewed, and L optic nerve enhancement was noted. Patient had MR c-spine and t-spine with and without contrast which showed no acute pathology. Patient was seen by PT and did not have any outpatient PT needs. Patient was neurologically stable and discharged home with Prednisone taper      MRI CERVICAL SPINE:  No abnormal intrinsic cord signal.  No abnormal enhancement.  No significant spinal canal stenosis or neural foraminal narrowing.    MRI THORACIC SPINE:  No abnormal intrinsic cord signal.  No abnormal enhancement.  No significant spinal canal stenosis or neural foraminal narrowing.   HPI:  Patient is 23yo F with  pmh  of  hx of covid, headaches presents to ED for optic neuritis treatment. Patient was sent in by her Ophthalmologist Dr. Neri Patiño upon MRI brain and orbits results stating abnormal enhancement of the left optic nerve in the intracanicular and intracranial segments of the nerve. Patient states the symptoms started around 1/1/2023 with pain on moving her left eye. Patient states there is decreased vision in the Left eye as well. Patient states its like a thin film covering her left eye. Patient of note saw a Neurologist last year Dr. Graff, for tremors and was noted to have 0.6cm left parietal lobe lesion.     Hospital Course   Patient was admitted to the neurology service for IV solumedrol x 5 days. Patient tolerated the treatment well and she had improvement of her symptoms. Patient outpatient MRI brain and orbits were reviewed, and L optic nerve enhancement was noted. Patient had MR c-spine and t-spine with and without contrast which showed no acute pathology. Patient was seen by PT and did not have any outpatient PT needs. Patient was neurologically stable and discharged home with Prednisone taper      MRI CERVICAL SPINE:  No abnormal intrinsic cord signal.  No abnormal enhancement.  No significant spinal canal stenosis or neural foraminal narrowing.    MRI THORACIC SPINE:  No abnormal intrinsic cord signal.  No abnormal enhancement.  No significant spinal canal stenosis or neural foraminal narrowing.    Plan:  Outpatient follow up with Dr. De Los Santos.    HPI:  Patient is 21yo F with  pmh  of  hx of covid, headaches presents to ED for optic neuritis treatment. Patient was sent in by her Ophthalmologist Dr. Neri Patiño upon MRI brain and orbits results stating abnormal enhancement of the left optic nerve in the intracanicular and intracranial segments of the nerve. Patient states the symptoms started around 1/1/2023 with pain on moving her left eye. Patient states there is decreased vision in the Left eye as well. Patient states its like a thin film covering her left eye. Patient of note saw a Neurologist last year Dr. Graff, for tremors and was noted to have 0.6cm left parietal lobe lesion.     Hospital Course   Patient was admitted to the neurology service for IV solumedrol x 5 days. Patient tolerated the treatment well and she had improvement of her symptoms. Patient outpatient MRI brain and orbits were reviewed, and L optic nerve enhancement was noted. Patient had MR c-spine and t-spine with and without contrast which showed no acute pathology. Patient was seen by PT and did not have any outpatient PT needs. Patient was neurologically stable and discharged home with Prednisone taper      MRI CERVICAL SPINE:  No abnormal intrinsic cord signal.  No abnormal enhancement.  No significant spinal canal stenosis or neural foraminal narrowing.    MRI THORACIC SPINE:  No abnormal intrinsic cord signal.  No abnormal enhancement.  No significant spinal canal stenosis or neural foraminal narrowing.    Plan:  Outpatient follow up with Dr. De Los Santos.   Prednisone taper (60 mg- going down by 10 mg daily)

## 2023-01-20 NOTE — DISCHARGE NOTE PROVIDER - NSDCFUSCHEDAPPT_GEN_ALL_CORE_FT
Kath De Los Santos  [facility]  NEUROLOGY [address] [facility]  Scheduled Appointment: 01/26/2023

## 2023-01-20 NOTE — PROGRESS NOTE ADULT - ASSESSMENT
Patient is 21yo F with  pmh  of  hx of covid, headaches presents to ED for optic neuritis treatment. Patient was sent in by her Ophthalmologist Dr. Neri Patiño upon MRI brain and orbits results stating abnormal enhancement of the left optic nerve in the intracanicular and intracranial segments of the nerve. Patient states the symptoms started around 1/1/2023 with pain on moving her left eye. Patient states there is decreased vision in the Left eye as well. Patient states its like a thin film covering her left eye. Patient of note saw a Neurologist last year Dr. Graff, for tremors and was noted to have 0.6cm left parietal lobe lesion. Patient denies fever, chills. numbness and tingling.     Impression:   Left eye pain with decreased vision 2/2 to Optic Neuritis      Plan    Optic Neuritis    - IV solumedrol 1g Qday for 5 days 1/18-1/22   - Optho following   - LP deferred, can be done outpatient  - Serum NMO/MOG, FLAKITO, dsDNAm B12, TFTs, Vit D sent       Diet: regular diet   GI PPx: Protonix 40mg qday         Case discussed with Dr. Woo.  Patient is 23yo F with  pmh  of  hx of covid, headaches presents to ED for optic neuritis treatment. Patient was sent in by her Ophthalmologist Dr. Neri Patiño upon MRI brain and orbits results stating abnormal enhancement of the left optic nerve in the intracanicular and intracranial segments of the nerve. Patient states the symptoms started around 1/1/2023 with pain on moving her left eye. Patient states there is decreased vision in the Left eye as well. Patient states its like a thin film covering her left eye. Patient of note saw a Neurologist last year Dr. Graff, for tremors and was noted to have 0.6cm left parietal lobe lesion. Patient denies fever, chills. numbness and tingling.     Impression:   Left eye pain with decreased vision 2/2 to Optic Neuritis      Plan    Optic Neuritis    - IV solumedrol 1g Qday for 5 days 1/18-1/22   - Optho following   - LP deferred, can be done outpatient  - Serum NMO/MOG, FLAKITO, dsDNAm B12, TFTs, Vit D sent   - MR C/T spine with and without contrast done, pending official read       Diet: regular diet   GI PPx: Protonix 40mg qday         Case discussed with Dr. Woo.

## 2023-01-20 NOTE — PROGRESS NOTE ADULT - SUBJECTIVE AND OBJECTIVE BOX
Interval History:  Patient seen and examined at the bedside. No acute events overnight.     ROS: Pertinent positives in HPI, all other ROS were reviewed and are negative.      MEDICATIONS  (STANDING):  enoxaparin Injectable 40 milliGRAM(s) SubCutaneous every 24 hours  methylPREDNISolone sodium succinate IVPB 1000 milliGRAM(s) IV Intermittent daily  pantoprazole    Tablet 40 milliGRAM(s) Oral before breakfast    MEDICATIONS  (PRN):    Allergies  No Known Allergies    Intolerances      Vital Signs Last 24 Hrs  T(C): 36.5 (2023 04:33), Max: 37.1 (2023 12:46)  T(F): 97.7 (2023 04:33), Max: 98.7 (2023 12:46)  HR: 76 (2023 04:33) (76 - 91)  BP: 96/58 (2023 04:33) (96/58 - 118/70)  BP(mean): --  RR: 18 (2023 04:33) (18 - 18)  SpO2: 96% (2023 04:33) (95% - 99%)    Parameters below as of 2023 04:33  Patient On (Oxygen Delivery Method): room air    NEUROLOGICAL EXAM:  MS: AAOX3, fluent, attends b/l; recent and remote memory intact; normal attention, language and fund of knowledge.   CN: EOMI, PERRL,. Decreased vison in the LEFT. Red color desaturation abnormalities noted.     V1-3 intact, no facial asymmetry, t/p midline,   Motor: Strength: 5/5 4x.   Tone: normal. Bulk: normal.   DTR 2+ symm.    Plantar flex b/l.   Sensation: intact  4x.   Coordination: intact 4x.   Gait: normal        Labs:   cbc                      13.6   6.87  )-----------( 254      ( 2023 20:51 )             41.1     [id number]    139  |  104  |  13  ----------------------------<  150<H>  4.0   |  21<L>  |  0.55    Ca    9.8      2023 06:57  Phos  3.7     01-20  Mg     2.1     -20    TPro  7.9  /  Alb  4.9  /  TBili  0.3  /  DBili  x   /  AST  43<H>  /  ALT  73<H>  /  AlkPhos  71  01-18    CoagsPT/INR - ( 2023 20:51 )   PT: 13.1 sec;   INR: 1.13 ratio         PTT - ( 2023 20:51 )  PTT:32.1 sec  [id number] Chol 253<H> LDL -- HDL 35<L> Trig 66  A1C  CardiacMarkers    LFTsLIVER FUNCTIONS - ( 2023 20:51 )  Alb: 4.9 g/dL / Pro: 7.9 g/dL / ALK PHOS: 71 U/L / ALT: 73 U/L / AST: 43 U/L / GGT: x           UAUrinalysis Basic - ( 2023 21:25 )    Color: Light Yellow / Appearance: Clear / S.010 / pH: x  Gluc: x / Ketone: Trace  / Bili: Negative / Urobili: Negative   Blood: x / Protein: Negative / Nitrite: Negative   Leuk Esterase: Negative / RBC: 7 /hpf / WBC 1 /HPF   Sq Epi: x / Non Sq Epi: 4 /hpf / Bacteria: Negative     Interval History:  Patient seen and examined at the bedside. No acute events overnight.     ROS: Pertinent positives in HPI, all other ROS were reviewed and are negative.      MEDICATIONS  (STANDING):  enoxaparin Injectable 40 milliGRAM(s) SubCutaneous every 24 hours  methylPREDNISolone sodium succinate IVPB 1000 milliGRAM(s) IV Intermittent daily  pantoprazole    Tablet 40 milliGRAM(s) Oral before breakfast    MEDICATIONS  (PRN):    Allergies  No Known Allergies    Intolerances      Vital Signs Last 24 Hrs  T(C): 36.5 (2023 04:33), Max: 37.1 (2023 12:46)  T(F): 97.7 (2023 04:33), Max: 98.7 (2023 12:46)  HR: 76 (2023 04:33) (76 - 91)  BP: 96/58 (2023 04:33) (96/58 - 118/70)  BP(mean): --  RR: 18 (2023 04:33) (18 - 18)  SpO2: 96% (2023 04:33) (95% - 99%)    Parameters below as of 2023 04:33  Patient On (Oxygen Delivery Method): room air    NEUROLOGICAL EXAM:  MS: AAOX3, fluent, attends b/l; recent and remote memory intact; normal attention, language and fund of knowledge.   CN: EOMI, PERRL - L APD present. Decreased vison in the LEFT. Red color desaturation abnormalities noted.     V1-3 intact, no facial asymmetry, t/p midline,   Motor: Strength: 5/5 4x.   Tone: normal. Bulk: normal.   DTR 2+ symm.    Plantar flex b/l.   Sensation: intact  4x.   Coordination: intact 4x.   Gait: normal        Labs:   cbc                      13.6   6.87  )-----------( 254      ( 2023 20:51 )             41.1     [id number]    139  |  104  |  13  ----------------------------<  150<H>  4.0   |  21<L>  |  0.55    Ca    9.8      2023 06:57  Phos  3.7     01-20  Mg     2.1     -20    TPro  7.9  /  Alb  4.9  /  TBili  0.3  /  DBili  x   /  AST  43<H>  /  ALT  73<H>  /  AlkPhos  71  01-18    CoagsPT/INR - ( 2023 20:51 )   PT: 13.1 sec;   INR: 1.13 ratio         PTT - ( 2023 20:51 )  PTT:32.1 sec  [id number] Chol 253<H> LDL -- HDL 35<L> Trig 66  A1C  CardiacMarkers    LFTsLIVER FUNCTIONS - ( 2023 20:51 )  Alb: 4.9 g/dL / Pro: 7.9 g/dL / ALK PHOS: 71 U/L / ALT: 73 U/L / AST: 43 U/L / GGT: x           UAUrinalysis Basic - ( 2023 21:25 )    Color: Light Yellow / Appearance: Clear / S.010 / pH: x  Gluc: x / Ketone: Trace  / Bili: Negative / Urobili: Negative   Blood: x / Protein: Negative / Nitrite: Negative   Leuk Esterase: Negative / RBC: 7 /hpf / WBC 1 /HPF   Sq Epi: x / Non Sq Epi: 4 /hpf / Bacteria: Negative

## 2023-01-20 NOTE — DISCHARGE NOTE PROVIDER - NSDCMRMEDTOKEN_GEN_ALL_CORE_FT
predniSONE: 60 milligram(s) orally once a day   predniSONE: 60 milligram(s) orally once a day  predniSONE 50 mg oral tablet: 50 tab(s) orally once a day MDD:50 mg day 1, 40 mg day 2, 30 mg day 3, 20 mg day 4, 10 mg day 5.   Protonix 20 mg oral delayed release tablet: 20 tab(s) orally once a day

## 2023-01-20 NOTE — DISCHARGE NOTE PROVIDER - CARE PROVIDER_API CALL
Kath De Los Santos)  Neurology  [address] [place] [address]  Phone: [phone]  Fax: [phone]  Follow Up Time:

## 2023-01-20 NOTE — PROGRESS NOTE ADULT - ASSESSMENT
22y female with a past medical history/ocular history of Covid and headaches consulted for optic neuritis treatment, found to be improving on steroids. Patient with vision 20/20 right eye, 20/30 left eye, pupils equal, round and reactive trace left APD, intraocular pressure within normal limits, extraocular movements, and confrontational visual fields, and color full both eyes. Dilated fundus exam within normal limits.     #Optic neuritis OS   - Hx, outpatient exam, and MRI very suggestive of optic neuritis   - Today VA and APD improved   - Color plates full, whoever does have mild red desaturation  - MRI spine wo any fatou lesion (although outpt MRI have 1 intracranial fatou lesion)   - CW steroids, 1g solumedrol for 3 days and then oral pred 1mg/kg/day for 11 days.    - Agree with LP for CSF studies please send Serum and CSF MOG and NMO antibodies  - Appreciate further neurology care    Pt seen and discussed with Dr. Mchugh  Ophthalmology signing off, please re-consult as needed.    Outpatient Follow-up: Patient should follow-up with his/her ophthalmologist or with [facility] Department of Ophthalmology within 1 week of after discharge at:    [address]  [place] [address]  [phone]

## 2023-01-20 NOTE — PROGRESS NOTE ADULT - SUBJECTIVE AND OBJECTIVE BOX
[facility] DEPARTMENT OF OPHTHALMOLOGY  ------------------------------------------------------------------------------  Jacobo Marsh MD PGY-3  Pager: [phone], also available on teams  ------------------------------------------------------------------------------    Interval History: No acute events overnight. Endorse vision sl better.     MEDICATIONS  (STANDING):  enoxaparin Injectable 40 milliGRAM(s) SubCutaneous every 24 hours  methylPREDNISolone sodium succinate IVPB 1000 milliGRAM(s) IV Intermittent daily  pantoprazole    Tablet 40 milliGRAM(s) Oral before breakfast    MEDICATIONS  (PRN):      VITALS: T(C): 36.7 (01-20-23 @ 17:04)  T(F): 98.1 (01-20-23 @ 17:04), Max: 98.8 (01-20-23 @ 14:30)  HR: 90 (01-20-23 @ 17:04) (74 - 91)  BP: 105/65 (01-20-23 @ 17:04) (96/58 - 116/72)  RR:  (18 - 18)  SpO2:  (95% - 99%)  Wt(kg): --  General: AAO x 3, appropriate mood and affect    Ophthalmology Exam:  Visual acuity (sc): 20/20 OD; 20/25+ OS  Pupils: PERRL OU, No APD OS  Extraocular movements (EOMs): Full OU, slight discomfort with EMs, no diplopia  Confrontational Visual Field (CVF): Full OU  Red desaturation OS 75% of OD    Pen Light Exam (PLE)  External: Flat OU  Lids/Lashes/Lacrimal Ducts: Flat OU    Sclera/Conjunctiva: W+Q OU  Cornea: Cl OU  Anterior Chamber: D+F OU    Iris: Flat OU  Lens: Cl OU    Labs/Imaging:  Outpatient MRI showing " stating abnormal enhancement of the left optic nerve in the intracanicular and intracranial segments of the nerve"     Outpatient Imaging 1/12/23  HVF 24-2: Full OD; OD unreliable patchy sup>inf defects  OCT ON: Full OU    < from: MR Cervical Spine w/wo IV Cont (01.19.23 @ 21:30) >  IMPRESSION:    MRI CERVICAL SPINE:  No abnormal intrinsic cord signal.  No abnormal enhancement.  No significant spinal canal stenosis or neural foraminal narrowing.    MRI THORACIC SPINE:  No abnormal intrinsic cord signal.  No abnormal enhancement.  No significant spinal canal stenosis or neural foraminal narrowing.        < end of copied text >

## 2023-01-21 LAB — ANA TITR SER: NEGATIVE — SIGNIFICANT CHANGE UP

## 2023-01-21 PROCEDURE — 99232 SBSQ HOSP IP/OBS MODERATE 35: CPT

## 2023-01-21 RX ORDER — DIPHENHYDRAMINE HCL 50 MG
25 CAPSULE ORAL ONCE
Refills: 0 | Status: COMPLETED | OUTPATIENT
Start: 2023-01-21 | End: 2023-01-21

## 2023-01-21 RX ADMIN — Medication 650 MILLIGRAM(S): at 15:52

## 2023-01-21 RX ADMIN — Medication 650 MILLIGRAM(S): at 15:22

## 2023-01-21 RX ADMIN — PANTOPRAZOLE SODIUM 40 MILLIGRAM(S): 20 TABLET, DELAYED RELEASE ORAL at 06:09

## 2023-01-21 RX ADMIN — Medication 650 MILLIGRAM(S): at 21:54

## 2023-01-21 RX ADMIN — ENOXAPARIN SODIUM 40 MILLIGRAM(S): 100 INJECTION SUBCUTANEOUS at 18:15

## 2023-01-21 RX ADMIN — Medication 650 MILLIGRAM(S): at 21:24

## 2023-01-21 RX ADMIN — Medication 58 MILLIGRAM(S): at 06:09

## 2023-01-21 RX ADMIN — Medication 25 MILLIGRAM(S): at 22:19

## 2023-01-21 NOTE — PROGRESS NOTE ADULT - ATTENDING COMMENTS
Seen and examined on rounds  22f with new onset optic neuritis OS  MRI brain reviewed and d/w neuroradiology - there is enhancement of the posterior aspect of the left ON extending to chiasm  there is a small nonspecific white matter lesion on the left that is not particularly demyelinating in appearance  spine MRI reviewed personally, there is no abnl cord signal (d/w neuroradiology today)    Clinically reports mild improvement in vision with steroids    on exam there is trace APD on left and decr color vision, acuity intact    otherwise normal exam    impression is ON  I discussed the diagnosis with the patient and mother  including possibility of developing MS over time and need for f/u  I discussed the need for steroid taper and risk of travel next week to [place]  per Dr. mendes no LP    plan to complete 5 days of IVSM (today is day 3/5) followed by prednisone taper over 2w  needs f/u with neuroimmunology  f/u MOG, NMO, ANCA/FLAKITO abs  ambulating ad neftaly
Patient seen and examined - agree with exam and plan as documented above - Cervical and thoracic spine without abnormal cord signal or enhancement. Left eye vision improving.  Continue IV Solumedrol - needs neurologic follow up as outpatient.

## 2023-01-21 NOTE — PROGRESS NOTE ADULT - SUBJECTIVE AND OBJECTIVE BOX
Neurology  Progress Note  01-21-23    Name:  LEIGHA HARDIN; 22y    Interval History: Patient seen and examined at bedside. Overnight patient reported anxiety for which she received Benadryl 25mg with no further events.    Medications:  acetaminophen     Tablet .. 650 milliGRAM(s) Oral every 6 hours PRN  enoxaparin Injectable 40 milliGRAM(s) SubCutaneous every 24 hours  methylPREDNISolone sodium succinate IVPB 1000 milliGRAM(s) IV Intermittent daily  pantoprazole    Tablet 40 milliGRAM(s) Oral before breakfast  psyllium Powder 1 Packet(s) Oral daily PRN  senna 2 Tablet(s) Oral at bedtime      Vitals:  T(C): 36.8 (01-20-23 @ 21:20), Max: 37.1 (01-20-23 @ 14:30)  HR: 71 (01-20-23 @ 21:20) (71 - 90)  BP: 119/71 (01-20-23 @ 21:20) (105/65 - 119/71)  RR: 18 (01-20-23 @ 21:20) (18 - 18)  SpO2: 97% (01-20-23 @ 21:20) (96% - 99%)    Physical Examination: INCOMPLETE  Neurologic:  - Mental Status: Alert, awake, oriented to person, place, and time; Speech is fluent with intact naming, repetition, and comprehension; Good overall fund of knowledge; Immediate recall is 3/3 words and delayed recall is 3/3 words at 5 minutes; Able to spell WORLD backwards and perform serial 7 subtraction; Able to read and write a sentence.  - Cranial Nerves:  II: Visual fields are full to confrontation; Pupils are equal, round, and reactive to light; Visual acuity is 20/20 bilaterally; Fundoscopic exam is normal with sharp discs.  III, IV, VI: Extraocular movements are intact without nystagmus.  V: Facial sensation is intact in the V1-V3 distribution bilaterally.  VII: Face is symmetric with normal eye closure and smile.  VIII: Hearing is intact to finger rub.  IX, X: Uvula is midline and soft palate rises symmetrically.  XI: Head turning and shoulder shrug are intact.  XII: Tongue protrudes in the midline.  - Motor: Strength is 5/5 throughout. There is no pronator drift. Normal muscle bulk and tone throughout.  - Reflexes: 2+ and symmetric at the biceps, triceps, brachioradialis, knees, and ankles. Plant responses down bilaterally.  - Sensory: Intact throughout to light touch, pin prick, vibration, and joint-position.  - Coordination: Finger-nose-finger and heel-knee-shin intact without dysmetria. Rapid alternating hand movements intact.  - Gait: Normal steps, base, arm swing, and turning. Heel and toe walking are normal. Tandem gait is normal. Romberg testing is negative.    Labs:    01-20    139  |  104  |  13  ----------------------------<  150<H>  4.0   |  21<L>  |  0.55    Ca    9.8      20 Jan 2023 06:57  Phos  3.7     01-20  Mg     2.1     01-20        Radiology:  MR Cervical Spine w/wo IV Cont (01.19.23 @ 21:30)  IMPRESSION:  MRI CERVICAL SPINE:  No abnormal intrinsic cord signal.  No abnormal enhancement.  No significant spinal canal stenosis or neural foraminal narrowing.    MRI THORACIC SPINE:  No abnormal intrinsic cord signal.  No abnormal enhancement.  No significant spinal canal stenosis or neural foraminal narrowing.      CT Head No Cont:  (18 Jan 2023 20:35)  IMPRESSION:  No acute intracranial bleeding, mass effect, or shift. If the patient has   new and persistent symptoms, consider short interval follow-up head CT or   brain MRI follow-up.    If clinical concern for optic neuritis is high, MRI of the orbits with   gadolinium may be obtained for a more sensitive evaluation. Neurology  Progress Note  01-21-23    Name:  LEIGHA HARDIN; 22y    Interval History: Patient seen and examined at bedside. Overnight patient reported anxiety for which she received Benadryl 25mg with no further events.    Medications:  acetaminophen     Tablet .. 650 milliGRAM(s) Oral every 6 hours PRN  enoxaparin Injectable 40 milliGRAM(s) SubCutaneous every 24 hours  methylPREDNISolone sodium succinate IVPB 1000 milliGRAM(s) IV Intermittent daily  pantoprazole    Tablet 40 milliGRAM(s) Oral before breakfast  psyllium Powder 1 Packet(s) Oral daily PRN  senna 2 Tablet(s) Oral at bedtime      Vitals:  T(C): 36.8 (01-20-23 @ 21:20), Max: 37.1 (01-20-23 @ 14:30)  HR: 71 (01-20-23 @ 21:20) (71 - 90)  BP: 119/71 (01-20-23 @ 21:20) (105/65 - 119/71)  RR: 18 (01-20-23 @ 21:20) (18 - 18)  SpO2: 97% (01-20-23 @ 21:20) (96% - 99%)    Physical Examination: INCOMPLETE  NEUROLOGICAL EXAM:  MS: AAOX3, fluent, attends b/l; recent and remote memory intact; normal attention, language and fund of knowledge.   CN: EOMI, PERRL - L APD present. Decreased vison in the LEFT. Red color desaturation abnormalities noted.     V1-3 intact, no facial asymmetry, t/p midline,   Motor: Strength: 5/5 4x.   Tone: normal. Bulk: normal.   DTR 2+ symm.    Plantar flex b/l.   Sensation: intact  4x.   Coordination: intact 4x.   Gait: normal    Labs:    01-20    139  |  104  |  13  ----------------------------<  150<H>  4.0   |  21<L>  |  0.55    Ca    9.8      20 Jan 2023 06:57  Phos  3.7     01-20  Mg     2.1     01-20        Radiology:  MR Cervical Spine w/wo IV Cont (01.19.23 @ 21:30)  IMPRESSION:  MRI CERVICAL SPINE:  No abnormal intrinsic cord signal.  No abnormal enhancement.  No significant spinal canal stenosis or neural foraminal narrowing.    MRI THORACIC SPINE:  No abnormal intrinsic cord signal.  No abnormal enhancement.  No significant spinal canal stenosis or neural foraminal narrowing.    CT Head No Cont:  (18 Jan 2023 20:35)  IMPRESSION:  No acute intracranial bleeding, mass effect, or shift. If the patient has   new and persistent symptoms, consider short interval follow-up head CT or   brain MRI follow-up.    If clinical concern for optic neuritis is high, MRI of the orbits with   gadolinium may be obtained for a more sensitive evaluation. Neurology  Progress Note  01-21-23    Name:  LEIGHA HARDIN; 22y    Interval History: Patient seen and examined at bedside. Overnight patient reported anxiety for which she received Benadryl 25mg with no further events. This morning reports vision has continued to improve although she reports her vision is "unfocused." She reports having a left-sided headache occasionally, not currently, and denies eye pain.    Medications:  acetaminophen     Tablet .. 650 milliGRAM(s) Oral every 6 hours PRN  enoxaparin Injectable 40 milliGRAM(s) SubCutaneous every 24 hours  methylPREDNISolone sodium succinate IVPB 1000 milliGRAM(s) IV Intermittent daily  pantoprazole    Tablet 40 milliGRAM(s) Oral before breakfast  psyllium Powder 1 Packet(s) Oral daily PRN  senna 2 Tablet(s) Oral at bedtime      Vitals:  T(C): 36.8 (01-20-23 @ 21:20), Max: 37.1 (01-20-23 @ 14:30)  HR: 71 (01-20-23 @ 21:20) (71 - 90)  BP: 119/71 (01-20-23 @ 21:20) (105/65 - 119/71)  RR: 18 (01-20-23 @ 21:20) (18 - 18)  SpO2: 97% (01-20-23 @ 21:20) (96% - 99%)    Physical Examination:  NEUROLOGICAL EXAM:  MS: AAOX3, fluent, attends b/l; recent and remote memory intact; normal attention, language and fund of knowledge.   CN: EOMI, PERRL - L APD present. Decreased vison in the LEFT. V1-3 intact, no facial asymmetry, t/p midline  Motor: Strength: 5/5 4x. No pronator drift. No tremor or myoclonus noted.  Tone: normal. Bulk: normal.    Sensation: intact  4x.   Coordination: Finger to nose intact without ataxia or dysmetria b/l. Finger tapping intact b/l.  Gait: Did not assess    Labs:    01-20    139  |  104  |  13  ----------------------------<  150<H>  4.0   |  21<L>  |  0.55    Ca    9.8      20 Jan 2023 06:57  Phos  3.7     01-20  Mg     2.1     01-20        Radiology:  MR Cervical Spine w/wo IV Cont (01.19.23 @ 21:30)  IMPRESSION:  MRI CERVICAL SPINE:  No abnormal intrinsic cord signal.  No abnormal enhancement.  No significant spinal canal stenosis or neural foraminal narrowing.    MRI THORACIC SPINE:  No abnormal intrinsic cord signal.  No abnormal enhancement.  No significant spinal canal stenosis or neural foraminal narrowing.    CT Head No Cont:  (18 Jan 2023 20:35)  IMPRESSION:  No acute intracranial bleeding, mass effect, or shift. If the patient has   new and persistent symptoms, consider short interval follow-up head CT or   brain MRI follow-up.    If clinical concern for optic neuritis is high, MRI of the orbits with   gadolinium may be obtained for a more sensitive evaluation.

## 2023-01-21 NOTE — PROGRESS NOTE ADULT - ASSESSMENT
21yo F with  pmh  of  hx of covid, headaches presents to ED for optic neuritis treatment. Patient was sent in by her Ophthalmologist Dr. Neri Patiño upon MRI brain and orbits results stating abnormal enhancement of the left optic nerve in the intracanicular and intracranial segments of the nerve. Patient states the symptoms started around 1/1/2023 with pain on moving her left eye. Patient states there is decreased vision in the Left eye as well. Patient states its like a thin film covering her left eye. Patient of note saw a Neurologist last year Dr. Graff, for tremors and was noted to have 0.6cm left parietal lobe lesion. Patient denies fever, chills. numbness and tingling.     Impression: Left eye pain with decreased vision 2/2 to Optic Neuritis    Plan:   Optic Neuritis    [] IV solumedrol 1g Qday for 5 days 1/18-1/22   [] Ophtho following and appreciate recommendations  [] LP deferred, can be done outpatient  [] Serum NMO/MOG, FLAKITO, dsDNAm B12, TFTs, Vit D sent   [] MR C/T spine with no abnormal enhancement      Diet: regular diet   GI PPx: Protonix 40mg qday     Case to be seen and discussed with neurology attending Dr. Milton. 21yo F with  pmh  of  hx of covid, headaches presents to ED for optic neuritis treatment. Patient was sent in by her Ophthalmologist Dr. Neri Patiño upon MRI brain and orbits results stating abnormal enhancement of the left optic nerve in the intracanicular and intracranial segments of the nerve. Patient states the symptoms started around 1/1/2023 with pain on moving her left eye. Patient states there is decreased vision in the Left eye as well. Patient states its like a thin film covering her left eye. Patient of note saw a Neurologist last year Dr. Graff, for tremors and was noted to have 0.6cm left parietal lobe lesion. Patient denies fever, chills. numbness and tingling.     Impression: Left eye pain with decreased vision 2/2 to Optic Neuritis    Plan:   Optic Neuritis    [] IV solumedrol 1g Qday for 5 days 1/18-1/22   [] Ophtho following and appreciate recommendations  [] LP deferred, can be done outpatient  [] Serum NMO/MOG, FLAKITO, dsDNAm B12, TFTs, Vit D sent   [] MR C/T spine with no abnormal enhancement    Diet: regular diet   GI PPx: Protonix 40mg qday  Dispo: Plan to discharge home tomorrow after last steroid dose.    Case seen and discussed with neurology attending Dr. Milton.

## 2023-01-22 ENCOUNTER — TRANSCRIPTION ENCOUNTER (OUTPATIENT)
Age: 23
End: 2023-01-22

## 2023-01-22 VITALS
OXYGEN SATURATION: 96 % | TEMPERATURE: 98 F | HEART RATE: 73 BPM | SYSTOLIC BLOOD PRESSURE: 124 MMHG | RESPIRATION RATE: 18 BRPM | DIASTOLIC BLOOD PRESSURE: 71 MMHG

## 2023-01-22 LAB
AUTO DIFF PNL BLD: NEGATIVE — SIGNIFICANT CHANGE UP
C-ANCA SER-ACNC: NEGATIVE — SIGNIFICANT CHANGE UP
MPO AB + PR3 PNL SER: SIGNIFICANT CHANGE UP
P-ANCA SER-ACNC: NEGATIVE — SIGNIFICANT CHANGE UP

## 2023-01-22 PROCEDURE — 82306 VITAMIN D 25 HYDROXY: CPT

## 2023-01-22 PROCEDURE — 84443 ASSAY THYROID STIM HORMONE: CPT

## 2023-01-22 PROCEDURE — 86053 AQAPRN-4 ANTB FLO CYTMTRY EA: CPT

## 2023-01-22 PROCEDURE — 86225 DNA ANTIBODY NATIVE: CPT

## 2023-01-22 PROCEDURE — 82746 ASSAY OF FOLIC ACID SERUM: CPT

## 2023-01-22 PROCEDURE — 70450 CT HEAD/BRAIN W/O DYE: CPT | Mod: MA

## 2023-01-22 PROCEDURE — 83735 ASSAY OF MAGNESIUM: CPT

## 2023-01-22 PROCEDURE — 72157 MRI CHEST SPINE W/O & W/DYE: CPT

## 2023-01-22 PROCEDURE — 84100 ASSAY OF PHOSPHORUS: CPT

## 2023-01-22 PROCEDURE — 0225U NFCT DS DNA&RNA 21 SARSCOV2: CPT

## 2023-01-22 PROCEDURE — 99285 EMERGENCY DEPT VISIT HI MDM: CPT | Mod: 25

## 2023-01-22 PROCEDURE — 84480 ASSAY TRIIODOTHYRONINE (T3): CPT

## 2023-01-22 PROCEDURE — 86140 C-REACTIVE PROTEIN: CPT

## 2023-01-22 PROCEDURE — 36415 COLL VENOUS BLD VENIPUNCTURE: CPT

## 2023-01-22 PROCEDURE — 86036 ANCA SCREEN EACH ANTIBODY: CPT

## 2023-01-22 PROCEDURE — 86376 MICROSOMAL ANTIBODY EACH: CPT

## 2023-01-22 PROCEDURE — 85730 THROMBOPLASTIN TIME PARTIAL: CPT

## 2023-01-22 PROCEDURE — 80048 BASIC METABOLIC PNL TOTAL CA: CPT

## 2023-01-22 PROCEDURE — 82607 VITAMIN B-12: CPT

## 2023-01-22 PROCEDURE — 80061 LIPID PANEL: CPT

## 2023-01-22 PROCEDURE — 86235 NUCLEAR ANTIGEN ANTIBODY: CPT

## 2023-01-22 PROCEDURE — 80053 COMPREHEN METABOLIC PANEL: CPT

## 2023-01-22 PROCEDURE — 85652 RBC SED RATE AUTOMATED: CPT

## 2023-01-22 PROCEDURE — 84436 ASSAY OF TOTAL THYROXINE: CPT

## 2023-01-22 PROCEDURE — 84702 CHORIONIC GONADOTROPIN TEST: CPT

## 2023-01-22 PROCEDURE — 85025 COMPLETE CBC W/AUTO DIFF WBC: CPT

## 2023-01-22 PROCEDURE — 85610 PROTHROMBIN TIME: CPT

## 2023-01-22 PROCEDURE — 86038 ANTINUCLEAR ANTIBODIES: CPT

## 2023-01-22 PROCEDURE — 83036 HEMOGLOBIN GLYCOSYLATED A1C: CPT

## 2023-01-22 PROCEDURE — 72156 MRI NECK SPINE W/O & W/DYE: CPT

## 2023-01-22 PROCEDURE — 86362 MOG-IGG1 ANTB CBA EACH: CPT

## 2023-01-22 PROCEDURE — A9585: CPT

## 2023-01-22 PROCEDURE — 71045 X-RAY EXAM CHEST 1 VIEW: CPT

## 2023-01-22 PROCEDURE — 81001 URINALYSIS AUTO W/SCOPE: CPT

## 2023-01-22 RX ORDER — PANTOPRAZOLE SODIUM 20 MG/1
20 TABLET, DELAYED RELEASE ORAL
Qty: 1 | Refills: 0
Start: 2023-01-22

## 2023-01-22 RX ADMIN — Medication 58 MILLIGRAM(S): at 05:44

## 2023-01-22 RX ADMIN — PANTOPRAZOLE SODIUM 40 MILLIGRAM(S): 20 TABLET, DELAYED RELEASE ORAL at 05:51

## 2023-01-22 NOTE — DISCHARGE NOTE NURSING/CASE MANAGEMENT/SOCIAL WORK - NSDCPEFALRISK_GEN_ALL_CORE
For information on Fall & Injury Prevention, visit: [email or web address] OR  [email or web address] OR  https://www.cdc.gov/steadi/patient.html

## 2023-01-22 NOTE — DISCHARGE NOTE NURSING/CASE MANAGEMENT/SOCIAL WORK - PATIENT PORTAL LINK FT
You can access the FollowMyHealth Patient Portal offered by [facility] by registering at the following website: [email or web address]. By joining [facility]’s FollowMyHealth portal, you will also be able to view your health information using other applications (apps) compatible with our system.

## 2023-01-23 ENCOUNTER — TRANSCRIPTION ENCOUNTER (OUTPATIENT)
Age: 23
End: 2023-01-23

## 2023-01-23 PROBLEM — Z78.9 OTHER SPECIFIED HEALTH STATUS: Chronic | Status: ACTIVE | Noted: 2023-01-18

## 2023-01-23 LAB
DSDNA AB FLD-ACNC: <0.2 AI — SIGNIFICANT CHANGE UP
ENA SS-A AB FLD IA-ACNC: <0.2 AI — SIGNIFICANT CHANGE UP

## 2023-01-26 ENCOUNTER — APPOINTMENT (OUTPATIENT)
Dept: NEUROLOGY | Facility: CLINIC | Age: 23
End: 2023-01-26
Payer: COMMERCIAL

## 2023-01-26 VITALS
SYSTOLIC BLOOD PRESSURE: 138 MMHG | DIASTOLIC BLOOD PRESSURE: 77 MMHG | WEIGHT: 216 LBS | HEART RATE: 89 BPM | HEIGHT: 67 IN | BODY MASS INDEX: 33.9 KG/M2

## 2023-01-26 LAB
AQP4 H2O CHANNEL AB SERPL IA-ACNC: NEGATIVE — SIGNIFICANT CHANGE UP
MOG AB SER QL CBA IFA: NEGATIVE — SIGNIFICANT CHANGE UP

## 2023-01-26 PROCEDURE — 99417 PROLNG OP E/M EACH 15 MIN: CPT

## 2023-01-26 PROCEDURE — 99215 OFFICE O/P EST HI 40 MIN: CPT

## 2023-01-26 RX ORDER — SERTRALINE HYDROCHLORIDE 100 MG/1
100 TABLET, FILM COATED ORAL
Qty: 90 | Refills: 0 | Status: DISCONTINUED | COMMUNITY
Start: 2021-01-18 | End: 2023-01-26

## 2023-01-26 RX ORDER — DEXTROAMPHETAMINE SACCHARATE, AMPHETAMINE ASPARTATE MONOHYDRATE, DEXTROAMPHETAMINE SULFATE AND AMPHETAMINE SULFATE 6.25; 6.25; 6.25; 6.25 MG/1; MG/1; MG/1; MG/1
25 CAPSULE, EXTENDED RELEASE ORAL
Qty: 30 | Refills: 0 | Status: DISCONTINUED | COMMUNITY
Start: 2021-01-14 | End: 2023-01-26

## 2023-01-26 RX ORDER — LEVONORGESTREL AND ETHINYL ESTRADIOL 0.1-0.02MG
0.1-2 KIT ORAL
Qty: 84 | Refills: 0 | Status: DISCONTINUED | COMMUNITY
Start: 2021-01-18 | End: 2023-01-26

## 2023-01-26 NOTE — DATA REVIEWED
[de-identified] : MRI brain w/w/o- single L parietal subcortical T2 hyperintense  lesion, otherwise no abnormality and no enhancement. no callosal lesion. no infratentorial lesion. \par MRI orbits w/w/o-enhancement of intracanicular and intracranial segment of L optic nerve \par MRI C and T spine w/w/o- normal cord.

## 2023-01-26 NOTE — HISTORY OF PRESENT ILLNESS
[FreeTextEntry1] : HPI (initial visit Jan 26, 2023)- LEIGHA HARDIN is a 22 year old woman recently admitted to [facility] 1/18-1/22/2023 for L optic neuritis, s/p 5 days IV solumedrol with improvement in symptoms.\par \par Her symptoms began 1/1/2023, with pain on moving L eye. This was followed by cloudiness over  L eye- like a film over eye. Of note, she also tested positive for COVID around the same time. She was seen by ophthalmologist, Dr. Neri Patiño, who recommended she present to ED for optic neuritis, given enhancement of intracanicular and intracranial segment of L optic nerve on outside MRI scan. She also had an outside brain MRI which showed a single L parietal subcortical T2 hyperintense  lesion, otherwise no abnormality and no enhancement. MRI C and T spine did not show any cord signal abnormality. She completed 5 days of IV solumedrol with symptom improvement. She was discharged home on prednisone taper (60 mg QD, taper down by 10 mg daily). \par \par Labs in hospital reviewed- serum NMO and MOG ab pending. \par ESR 3, CRP < 3. HbA1c 5.4%, TSH normal. B12 692. VitD 31.\par SSA/SSB, DsDNA, Joyce-1 ab, Sm/RNP ab, RF, ANCA WNL\par FLAKITO 1:80, speckled\par \par Vision is 20-30% better. She is still on prednisone 30 mg QD. Pain with eye movements has resolved. 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 \par \par Hx of COVID-19 infection 12/2021.\par \par She has a hx of migraine headaches-since 19 years of age, can be one sided. "hurt so bad". no vision changes. + photophobia. no phonophobia. no nausea. no dizziness. can last a day. no triggers. Also has tension type headache. \par \par no family Ms or autoimmune disease.

## 2023-01-26 NOTE — ASSESSMENT
[FreeTextEntry1] : Assessment/Plan:\par  22 year old female w/ new onset left optic neuritis, s/p 5 days IV solumedrol, now on prednisone taper. 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 \par \par I reviewed MRI imaging of brain and orbits with patient and mother. There is evidence of active left optic neuritis and a single isolated L parietal subcortical FLAIR hyperintense lesions (this could be non specific - ?related to hx of migraine headaches). \par \par Given concomitant COVID-19 disease at the time of symptom onset, this could likely be a monophasic inflammatory event (post COVID). However, CIS (early Multiple sclerosis), should also be considered, and given overall MRI findings, would consider this low risk CIS- in which case risk of conversion to MS is 20% in the next 10-15 years. Would recommend wait-and-see approach at this time with routine clinical and radiological surveillance. \par \par Her vision OS has improved on steroids, however still not back 100%. VA is 20/25 OS with mild color desaturation and slight left sided field cut. Although objectively her findings are mild, she remains bothered by the vision changes. I reassured patient that I suspected her vision would continue to improve over time. \par \par Plan-\par [] Continue current prednisone taper. Continue pantoprazole for GI ppx\par [] Will refer to neuro ophthalmologist, Dr. Alejo Montgomery\par [] Will plan to repeat brain MRI w/w/o contrast in 6 months\par [] Will follow up with serum NMO and MOG antibody tests\par \par Return to clinic 3 months\par \par The above plan was discussed with LEIGHA HARDIN in great detail.  LEIGHA HARDIN verbalized understanding and agrees with plan as detailed above. Patient was provided education and counselling on current diagnosis/symptoms. She was advised to call our clinic at [phone] for any new or worsening symptoms, or with any questions or concerns. In case of acute onset of neurological symptoms or worsening presentation, patient was advised to present to nearest emergency room for further evaluation. LEIGHA HARDIN expressed understanding and all her questions/concerns were addressed.\par \par Kath De Los Santos M.D\par

## 2023-01-26 NOTE — PHYSICAL EXAM
[FreeTextEntry1] : PHYSICAL EXAM\par Constitutional: Alert, no acute distress \par Psychiatric: appropriate affect and mood\par Pulmonary: No respiratory distress, stable on room air\par \par NEUROLOGICAL EXAM\par Mental status: The patient is alert, attentive and oriented\par Speech/language: No dysarthria\par Cranial nerves:\par CN II: + RAPD OS. Red color desaturation OS. VA 20/20 OD and 20/25 OS. VFC with slight field restriction over left visual field OS. \par CN III, IV, VI: EOMI, no nystagmus, no ptosis\par CN V: Facial sensation is intact to pinprick in all 3 divisions bilaterally.\par CN VII: Face is symmetric with normal eye closure and smile.\par CN VII: Hearing is normal to rubbing fingers\par CN IX, X: Palate elevates symmetrically. Phonation is normal.\par CN XI: Head turning and shoulder shrug are intact\par CN XII: Tongue is midline with normal movements and no atrophy.\par Motor: Strength is full bilaterally. 5/5 muscle power in bilateral UE and LE.\par Reflexes:                 R        L\par                  Biceps    2+       2+\par                  Patellar   2+       2+\par                  Achilles  2+       2+\par                  Plantar responses- R down\par                                                  L down\par Sensory:  Intact sensations to LT in UE and LE. \par Coordination: There is no dysmetria on finger-to-nose. \par Gait/Stance: Posture is normal. Gait is steady with normal steps, base, arm swing, and turning. Heel and toe walking are normal. Tandem gait is normal. Romberg is absent.\par \par \par \par \par

## 2023-02-06 ENCOUNTER — NON-APPOINTMENT (OUTPATIENT)
Age: 23
End: 2023-02-06

## 2023-02-06 ENCOUNTER — APPOINTMENT (OUTPATIENT)
Dept: OPHTHALMOLOGY | Facility: CLINIC | Age: 23
End: 2023-02-06
Payer: COMMERCIAL

## 2023-02-06 PROCEDURE — 99214 OFFICE O/P EST MOD 30 MIN: CPT

## 2023-02-06 PROCEDURE — 92133 CPTRZD OPH DX IMG PST SGM ON: CPT

## 2023-02-06 PROCEDURE — 92083 EXTENDED VISUAL FIELD XM: CPT

## 2023-03-27 ENCOUNTER — APPOINTMENT (OUTPATIENT)
Dept: OPHTHALMOLOGY | Facility: CLINIC | Age: 23
End: 2023-03-27
Payer: COMMERCIAL

## 2023-03-27 ENCOUNTER — NON-APPOINTMENT (OUTPATIENT)
Age: 23
End: 2023-03-27

## 2023-03-27 PROCEDURE — 99214 OFFICE O/P EST MOD 30 MIN: CPT

## 2023-03-27 PROCEDURE — 92133 CPTRZD OPH DX IMG PST SGM ON: CPT

## 2023-03-27 PROCEDURE — 92083 EXTENDED VISUAL FIELD XM: CPT

## 2023-03-28 ENCOUNTER — TRANSCRIPTION ENCOUNTER (OUTPATIENT)
Age: 23
End: 2023-03-28

## 2023-03-30 ENCOUNTER — APPOINTMENT (OUTPATIENT)
Dept: NEUROLOGY | Facility: CLINIC | Age: 23
End: 2023-03-30
Payer: COMMERCIAL

## 2023-03-30 VITALS
BODY MASS INDEX: 32.96 KG/M2 | WEIGHT: 210 LBS | SYSTOLIC BLOOD PRESSURE: 94 MMHG | HEART RATE: 76 BPM | DIASTOLIC BLOOD PRESSURE: 66 MMHG | HEIGHT: 67 IN

## 2023-03-30 PROCEDURE — 99214 OFFICE O/P EST MOD 30 MIN: CPT

## 2023-03-30 RX ORDER — PANTOPRAZOLE 20 MG/1
20 TABLET, DELAYED RELEASE ORAL DAILY
Refills: 0 | Status: DISCONTINUED | COMMUNITY
End: 2023-03-30

## 2023-03-30 RX ORDER — PREDNISONE 10 MG/1
10 TABLET ORAL DAILY
Refills: 0 | Status: DISCONTINUED | COMMUNITY
End: 2023-03-30

## 2023-03-30 NOTE — ASSESSMENT
[FreeTextEntry1] : Assessment/Plan:\par 1. 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 \par \par I reviewed MRI imaging of brain and orbits with patient and mother. There is evidence of active left optic neuritis and a single isolated L parietal subcortical FLAIR hyperintense lesions (this could be non specific - ?related to hx of migraine headaches). \par \par Given concomitant COVID-19 disease at the time of symptom onset, this could likely be a monophasic inflammatory event (post COVID). However, CIS (early Multiple sclerosis), should also be considered, and given overall MRI findings, would consider this low risk CIS- in which case risk of conversion to MS is 20% in the next 10-15 years. Would recommend wait-and-watch approach at this time with routine clinical and radiological surveillance. \par \par Plan-\par [] Will plan to repeat brain MRI w/w/o contrast in June/July 2023 for radiologically stability(order at next visit)\par [] Start Vitamin D3 [id number] IU daily\par [] Continue to follow with neuro ophthalmologist, Dr. Alejo Montgomery as needed\par \par \par 2. # Migraine headaches with aura- since age 19. Recent right eye visual disturbances likely migraine aura. HA freq: 4 x /week. Also experiences tension headaches. Recent neuro ophtho evaluation normal 3/27. \par \par Plan:-\par [] Start sumatriptan 50 mg tablet, take 1 tablet at the onset of the headache. Can repeat dose in 2 hours if needed. Limit dose to 2 tab/day and 5 tab/week. Discussed the side effects drowsiness and nausea/diarrhea.\par [] Over the counter preventative therapies discussed, which include Magnesium 400 mg QD (discussed potential side effect of diarrhea), riboflavin 400 mg QD and Coenzyme Q10 100 mg daily.\par [] Will start on amitriptyline 25 mg qhs for migraine prevention\par \par Headache education provided:\par [] Stay well hydrated\par [] Limit excessive caffeine and alcohol intake\par [] Maintain good sleep hygiene. Follow a consistent sleep and wake schedule. \par [] Practice good eating habits. Avoid skipping meals. \par [] Try to avoid any known triggers\par [] Avoid excessive use of over the counter pain medications, as they can cause medication overuse headaches \par [] Keep a headache diary\par [] Relaxation techniques, biofeedback, massage therapy, acupunctures, and heating pads may be effective\par \par \par \par Return to clinic 2 months\par \par The above plan was discussed with LEIGHA HARDIN in great detail.  LEIGHA HARDIN verbalized understanding and agrees with plan as detailed above. Patient was provided education and counselling on current diagnosis/symptoms. She was advised to call our clinic at [phone] for any new or worsening symptoms, or with any questions or concerns. In case of acute onset of neurological symptoms or worsening presentation, patient was advised to present to nearest emergency room for further evaluation. LEIGHA HARDIN expressed understanding and all her questions/concerns were addressed.\par \par Kath De Los Santos M.D\par

## 2023-03-30 NOTE — DATA REVIEWED
[de-identified] : MRI brain w/w/o- single L parietal subcortical T2 hyperintense  lesion, otherwise no abnormality and no enhancement. no callosal lesion. no infratentorial lesion. \par MRI orbits w/w/o-enhancement of intracanicular and intracranial segment of L optic nerve \par MRI C and T spine w/w/o- normal cord.  [de-identified] : Serum NMO and MOG antibody negative 1/2023.

## 2023-03-30 NOTE — PHYSICAL EXAM
[FreeTextEntry1] : PHYSICAL EXAM\par Constitutional: Alert, no acute distress \par Psychiatric: appropriate affect and mood\par Pulmonary: No respiratory distress, stable on room air\par \par NEUROLOGICAL EXAM\par Mental status: The patient is alert, attentive and conversational memory intact\par Speech/language: No dysarthria\par Cranial nerves:\par CN II: PERRL, No RAPD. No red color desaturation OU. VA 20/20 OD and 20/20 OS. VFC intact/full, \par CN III, IV, VI: EOMI, no nystagmus, no ptosis\par CN V: Facial sensation is intact to pinprick in all 3 divisions bilaterally.\par CN VII: Face is symmetric with normal eye closure and smile.\par CN VII: Hearing is normal to rubbing fingers\par CN IX, X: Palate elevates symmetrically. Phonation is normal.\par CN XI: Head turning and shoulder shrug are intact\par CN XII: Tongue is midline with normal movements and no atrophy.\par Motor: Strength is full bilaterally. 5/5 muscle power in bilateral UE and LE.\par Reflexes: R L\par  Biceps 2+ 2+\par  Patellar 2+ 2+\par  Achilles 2+ 2+\par  Plantar responses- R down\par  L down\par Sensory: Intact sensations to LT in UE and LE. \par Coordination: There is no dysmetria on finger-to-nose. \par Gait/Stance: Posture is normal. Gait is steady with normal steps, base, arm swing, and turning. Heel and toe walking are normal. Tandem gait is normal. Romberg is absent.

## 2023-03-30 NOTE — HISTORY OF PRESENT ILLNESS
[FreeTextEntry1] : INTERIM HX 03/30/2023: Serum NMO and MOG antibody negative 1/2023. Seen by Dr Montgomery, 2/6, VA 20/20 OD and 20/25 OS with PH, trace RAPD OS, no disc edema, normal OCT, "non specific" HVF defect OS. \par Recently seen by Dr Montgomery 3/27 for pain in R eye ("swollen and tight") with blurry vision x 1.5 weeks, associated with irritation, VA 20/20 OU, trace APD OS, normal fundus exam, HVF and OCT normal, no evidence of optic neuritis in R eye.\par Current symptom: pressure around the right eye, intermittent blurry vision (lasts 5 min-30 min). strain over the right side of the head. No clear triggers. possibly some photophobia, happens more at work. Had a few migraines in the last week. \par Migraine freq:  4 x /week. Also gets tension headaches. Sleep is good. Was on emgality in the past, it helped, would get 1-2 migraines a month. \par \par --------------------------------------------------------\par HPI (initial visit Jan 26, 2023)- LEIGHA HARDIN is a 22 year old woman recently admitted to [facility] 1/18-1/22/2023 for L optic neuritis, s/p 5 days IV solumedrol with improvement in symptoms.\par \par Her symptoms began 1/1/2023, with pain on moving L eye. This was followed by cloudiness over  L eye- like a film over eye. Of note, she also tested positive for COVID around the same time. She was seen by ophthalmologist, Dr. Neri Patiño, who recommended she present to ED for optic neuritis, given enhancement of intracanicular and intracranial segment of L optic nerve on outside MRI scan. She also had an outside brain MRI which showed a single L parietal subcortical T2 hyperintense  lesion, otherwise no abnormality and no enhancement. MRI C and T spine did not show any cord signal abnormality. She completed 5 days of IV solumedrol with symptom improvement. She was discharged home on prednisone taper (60 mg QD, taper down by 10 mg daily). \par \par Labs in hospital reviewed- serum NMO and MOG ab pending. \par ESR 3, CRP < 3. HbA1c 5.4%, TSH normal. B12 692. VitD 31.\par SSA/SSB, DsDNA, Joyce-1 ab, Sm/RNP ab, RF, ANCA WNL\par FLAKITO 1:80, speckled\par \par Vision is 20-30% better. She is still on prednisone 30 mg QD. Pain with eye movements has resolved. 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 \par \par Hx of COVID-19 infection 12/2021.\par \par She has a hx of migraine headaches-since 19 years of age, can be one sided. "hurt so bad". no vision changes. + photophobia. no phonophobia. no nausea. no dizziness. can last a day. no triggers. Also has tension type headache. \par \par no family Ms or autoimmune disease.

## 2023-05-05 ENCOUNTER — APPOINTMENT (OUTPATIENT)
Dept: NEUROLOGY | Facility: CLINIC | Age: 23
End: 2023-05-05
Payer: COMMERCIAL

## 2023-05-05 VITALS
DIASTOLIC BLOOD PRESSURE: 77 MMHG | SYSTOLIC BLOOD PRESSURE: 112 MMHG | BODY MASS INDEX: 32.96 KG/M2 | HEIGHT: 67 IN | WEIGHT: 210 LBS | HEART RATE: 76 BPM

## 2023-05-05 DIAGNOSIS — R20.0 ANESTHESIA OF SKIN: ICD-10-CM

## 2023-05-05 PROCEDURE — 99214 OFFICE O/P EST MOD 30 MIN: CPT

## 2023-05-05 NOTE — ASSESSMENT
[FreeTextEntry1] : Assessment/Plan:\par 1. Migraine headaches with aura- since age 19. \par HA freq: 4 x /week. Also experiences tension headaches. \par Recent neuro ophtho evaluation normal 3/27/2023. \par \par She never started amitriptyline or sumatriptan. \par \par Plan:-\par [] Start sumatriptan 50 mg tablet, take 1 tablet at the onset of the headache. Can repeat dose in 2 hours if needed. Limit dose to 2 tab/day and 5 tab/week. Discussed the side effects drowsiness and nausea/diarrhea.\par [] Over the counter preventative therapies discussed, which include Magnesium 400 mg QD (discussed potential side effect of diarrhea), riboflavin 400 mg QD and Coenzyme Q10 100 mg daily.\par [] Start amitriptyline 25 mg qhs for migraine prevention\par \par Headache education provided:\par [] Stay well hydrated\par [] Limit excessive caffeine and alcohol intake\par [] Maintain good sleep hygiene. Follow a consistent sleep and wake schedule. \par [] Practice good eating habits. Avoid skipping meals. \par [] Try to avoid any known triggers\par [] Avoid excessive use of over the counter pain medications, as they can cause medication overuse headaches \par [] Keep a headache diary\par [] Relaxation techniques, biofeedback, massage therapy, acupunctures, and heating pads may be effective\par \par 2. New R facial/RUE numbness/weakness mid April- now resolved\par Likely demyelinating event, gris given recent hx of L optic neuritis (1/2023, in the setting of + COVID and w/ brain MRI a single isolated L parietal subcortical FLAIR hyperintense lesions- migraine related vs demyelinating).  \par \par Plan-\par [] Will plan to repeat brain and cervical MRI w/w/o contrast  to rule out new demyelinating lesions\par [] Continue Vitamin D3\par \par \par Return to clinic 2 months, or sooner if needed.\par \par The above plan was discussed with LEIGHA HARDIN in great detail. LEIGHA HARDIN verbalized understanding and agrees with plan as detailed above. Patient was provided education and counselling on current diagnosis/symptoms. She was advised to call our clinic at [phone] for any new or worsening symptoms, or with any questions or concerns. In case of acute onset of neurological symptoms or worsening presentation, patient was advised to present to nearest emergency room for further evaluation. LEIGHA WILFRED expressed understanding and all her questions/concerns were addressed.\par \montana De Los Santos M.D

## 2023-05-05 NOTE — PHYSICAL EXAM
[FreeTextEntry1] : PHYSICAL EXAM\par Constitutional: Alert, no acute distress \par Psychiatric: appropriate affect and mood\par Pulmonary: No respiratory distress, stable on room air\par \par NEUROLOGICAL EXAM\par Mental status: The patient is alert, attentive and conversational memory intact\par Speech/language: No dysarthria\par Cranial nerves:\par CN II: PERRL, No RAPD. VFC intact/full.\par CN III, IV, VI: EOMI, no nystagmus, no ptosis. Jerky pursuit\par CN V: Facial sensation is intact to pinprick in all 3 divisions bilaterally.\par CN VII: Face is symmetric with normal eye closure and smile.\par CN VII: Hearing is normal to rubbing fingers\par CN IX, X: Palate elevates symmetrically. Phonation is normal.\par CN XI: Head turning and shoulder shrug are intact\par CN XII: Tongue is midline with normal movements and no atrophy.\par Motor: Strength is full bilaterally. 5/5 muscle power in bilateral UE and LE.\par Reflexes: R L\par  Biceps 2+ 2+\par  Patellar 2+ 2+\par  Achilles 2+ 2+\par  Plantar responses- \par R down\par  L down\par Sensory: Intact sensations to LT/PP/VIB in UE and LE. \par Coordination: There is no dysmetria on finger-to-nose and heel to shin. . \par Gait/Stance: Posture is normal. Gait is steady with normal steps, base, arm swing, and turning. Heel and toe walking are normal. Tandem gait is normal. Romberg is absent.

## 2023-05-05 NOTE — DATA REVIEWED
[de-identified] : MRI brain w/w/o- single L parietal subcortical T2 hyperintense  lesion, otherwise no abnormality and no enhancement. no callosal lesion. no infratentorial lesion. \par MRI orbits w/w/o-enhancement of intracanicular and intracranial segment of L optic nerve \par MRI C and T spine w/w/o- normal cord.  [de-identified] : Serum NMO and MOG antibody negative 1/2023.

## 2023-05-05 NOTE — HISTORY OF PRESENT ILLNESS
[FreeTextEntry1] : INTERIM HX 05/05/2023:  2.5 weeks ago had numbness over Right half of face, neck and entire R arm, no tingling, RUE felt heavy, no facial droop, got worse over a few days, now resolved. \par She is left handed.\par Bad migraine 3-4 x /week. Did not take sumatriptan or nortriptyline. Started supplements.\par [place] trip in 2 weeks.\par \par INTERIM HX 03/30/2023: Serum NMO and MOG antibody negative 1/2023. Seen by Dr Montgomery, 2/6, VA 20/20 OD and 20/25 OS with PH, trace RAPD OS, no disc edema, normal OCT, "non specific" HVF defect OS. \par Recently seen by Dr Montgomery 3/27 for pain in R eye ("swollen and tight") with blurry vision x 1.5 weeks, associated with irritation, VA 20/20 OU, trace APD OS, normal fundus exam, HVF and OCT normal, no evidence of optic neuritis in R eye.\par Current symptom: pressure around the right eye, intermittent blurry vision (lasts 5 min-30 min). strain over the right side of the head. No clear triggers. possibly some photophobia, happens more at work. Had a few migraines in the last week. \par Migraine freq:  4 x /week. Also gets tension headaches. Sleep is good. \par \par --------------------------------------------------------\par HPI (initial visit Jan 26, 2023)- LEIGHA HARDIN is a 22 year old woman recently admitted to [facility] 1/18-1/22/2023 for L optic neuritis, s/p 5 days IV solumedrol with improvement in symptoms.\par \par Her symptoms began 1/1/2023, with pain on moving L eye. This was followed by cloudiness over  L eye- like a film over eye. Of note, she also tested positive for COVID around the same time. She was seen by ophthalmologist, Dr. Neri Patiño, who recommended she present to ED for optic neuritis, given enhancement of intracanicular and intracranial segment of L optic nerve on outside MRI scan. She also had an outside brain MRI which showed a single L parietal subcortical T2 hyperintense  lesion, otherwise no abnormality and no enhancement. MRI C and T spine did not show any cord signal abnormality. She completed 5 days of IV solumedrol with symptom improvement. She was discharged home on prednisone taper (60 mg QD, taper down by 10 mg daily). \par \par Labs in hospital reviewed- serum NMO and MOG ab pending. \par ESR 3, CRP < 3. HbA1c 5.4%, TSH normal. B12 692. VitD 31.\par SSA/SSB, DsDNA, Joyce-1 ab, Sm/RNP ab, RF, ANCA WNL\par FLAKITO 1:80, speckled\par \par Vision is 20-30% better. She is still on prednisone 30 mg QD. Pain with eye movements has resolved. 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 \par \par Hx of COVID-19 infection 12/2021.\par \par She has a hx of migraine headaches-since 19 years of age, can be one sided. "hurt so bad". no vision changes. + photophobia. no phonophobia. no nausea. no dizziness. can last a day. no triggers. Also has tension type headache. \par \par no family Ms or autoimmune disease.

## 2023-05-19 ENCOUNTER — TRANSCRIPTION ENCOUNTER (OUTPATIENT)
Age: 23
End: 2023-05-19

## 2023-06-28 ENCOUNTER — TRANSCRIPTION ENCOUNTER (OUTPATIENT)
Age: 23
End: 2023-06-28

## 2023-06-30 ENCOUNTER — EMERGENCY (EMERGENCY)
Facility: HOSPITAL | Age: 23
LOS: 1 days | Discharge: HOME | End: 2023-06-30
Attending: EMERGENCY MEDICINE
Payer: COMMERCIAL

## 2023-06-30 VITALS
TEMPERATURE: 98 F | OXYGEN SATURATION: 98 % | HEIGHT: 67 IN | DIASTOLIC BLOOD PRESSURE: 85 MMHG | WEIGHT: 214.95 LBS | SYSTOLIC BLOOD PRESSURE: 131 MMHG | HEART RATE: 86 BPM | RESPIRATION RATE: 17 BRPM

## 2023-06-30 PROCEDURE — 99285 EMERGENCY DEPT VISIT HI MDM: CPT

## 2023-07-01 VITALS
SYSTOLIC BLOOD PRESSURE: 122 MMHG | HEART RATE: 66 BPM | RESPIRATION RATE: 18 BRPM | OXYGEN SATURATION: 99 % | DIASTOLIC BLOOD PRESSURE: 66 MMHG | TEMPERATURE: 98 F

## 2023-07-01 LAB
ALBUMIN SERPL ELPH-MCNC: 4.7 G/DL — SIGNIFICANT CHANGE UP (ref 3.3–5)
ALP SERPL-CCNC: 60 U/L — SIGNIFICANT CHANGE UP (ref 40–120)
ALT FLD-CCNC: 43 U/L — SIGNIFICANT CHANGE UP (ref 10–45)
ANION GAP SERPL CALC-SCNC: 13 MMOL/L — SIGNIFICANT CHANGE UP (ref 5–17)
APPEARANCE UR: CLEAR — SIGNIFICANT CHANGE UP
AST SERPL-CCNC: 25 U/L — SIGNIFICANT CHANGE UP (ref 10–40)
BACTERIA # UR AUTO: NEGATIVE — SIGNIFICANT CHANGE UP
BASOPHILS # BLD AUTO: 0.03 K/UL — SIGNIFICANT CHANGE UP (ref 0–0.2)
BASOPHILS NFR BLD AUTO: 0.3 % — SIGNIFICANT CHANGE UP (ref 0–2)
BILIRUB SERPL-MCNC: 0.2 MG/DL — SIGNIFICANT CHANGE UP (ref 0.2–1.2)
BILIRUB UR-MCNC: NEGATIVE — SIGNIFICANT CHANGE UP
BUN SERPL-MCNC: 15 MG/DL — SIGNIFICANT CHANGE UP (ref 7–23)
CALCIUM SERPL-MCNC: 9.7 MG/DL — SIGNIFICANT CHANGE UP (ref 8.4–10.5)
CHLORIDE SERPL-SCNC: 103 MMOL/L — SIGNIFICANT CHANGE UP (ref 96–108)
CO2 SERPL-SCNC: 23 MMOL/L — SIGNIFICANT CHANGE UP (ref 22–31)
COLOR SPEC: SIGNIFICANT CHANGE UP
CREAT SERPL-MCNC: 0.72 MG/DL — SIGNIFICANT CHANGE UP (ref 0.5–1.3)
DIFF PNL FLD: NEGATIVE — SIGNIFICANT CHANGE UP
EGFR: 120 ML/MIN/1.73M2 — SIGNIFICANT CHANGE UP
EOSINOPHIL # BLD AUTO: 0.07 K/UL — SIGNIFICANT CHANGE UP (ref 0–0.5)
EOSINOPHIL NFR BLD AUTO: 0.8 % — SIGNIFICANT CHANGE UP (ref 0–6)
EPI CELLS # UR: 3 — SIGNIFICANT CHANGE UP
GLUCOSE SERPL-MCNC: 101 MG/DL — HIGH (ref 70–99)
GLUCOSE UR QL: NEGATIVE — SIGNIFICANT CHANGE UP
HCG SERPL-ACNC: <2 MIU/ML — SIGNIFICANT CHANGE UP
HCT VFR BLD CALC: 37.9 % — SIGNIFICANT CHANGE UP (ref 34.5–45)
HGB BLD-MCNC: 12.6 G/DL — SIGNIFICANT CHANGE UP (ref 11.5–15.5)
HYALINE CASTS # UR AUTO: 1 /LPF — SIGNIFICANT CHANGE UP (ref 0–7)
IMM GRANULOCYTES NFR BLD AUTO: 0.6 % — SIGNIFICANT CHANGE UP (ref 0–0.9)
KETONES UR-MCNC: NEGATIVE — SIGNIFICANT CHANGE UP
LEUKOCYTE ESTERASE UR-ACNC: NEGATIVE — SIGNIFICANT CHANGE UP
LIDOCAIN IGE QN: 56 U/L — SIGNIFICANT CHANGE UP (ref 7–60)
LYMPHOCYTES # BLD AUTO: 3.79 K/UL — HIGH (ref 1–3.3)
LYMPHOCYTES # BLD AUTO: 44.1 % — HIGH (ref 13–44)
MAGNESIUM SERPL-MCNC: 2.1 MG/DL — SIGNIFICANT CHANGE UP (ref 1.6–2.6)
MCHC RBC-ENTMCNC: 30.7 PG — SIGNIFICANT CHANGE UP (ref 27–34)
MCHC RBC-ENTMCNC: 33.2 GM/DL — SIGNIFICANT CHANGE UP (ref 32–36)
MCV RBC AUTO: 92.4 FL — SIGNIFICANT CHANGE UP (ref 80–100)
MONOCYTES # BLD AUTO: 0.49 K/UL — SIGNIFICANT CHANGE UP (ref 0–0.9)
MONOCYTES NFR BLD AUTO: 5.7 % — SIGNIFICANT CHANGE UP (ref 2–14)
NEUTROPHILS # BLD AUTO: 4.16 K/UL — SIGNIFICANT CHANGE UP (ref 1.8–7.4)
NEUTROPHILS NFR BLD AUTO: 48.5 % — SIGNIFICANT CHANGE UP (ref 43–77)
NITRITE UR-MCNC: NEGATIVE — SIGNIFICANT CHANGE UP
NRBC # BLD: 0 /100 WBCS — SIGNIFICANT CHANGE UP (ref 0–0)
PH UR: 6 — SIGNIFICANT CHANGE UP (ref 5–8)
PLATELET # BLD AUTO: 238 K/UL — SIGNIFICANT CHANGE UP (ref 150–400)
POTASSIUM SERPL-MCNC: 3.9 MMOL/L — SIGNIFICANT CHANGE UP (ref 3.5–5.3)
POTASSIUM SERPL-SCNC: 3.9 MMOL/L — SIGNIFICANT CHANGE UP (ref 3.5–5.3)
PROT SERPL-MCNC: 7.3 G/DL — SIGNIFICANT CHANGE UP (ref 6–8.3)
PROT UR-MCNC: ABNORMAL
RBC # BLD: 4.1 M/UL — SIGNIFICANT CHANGE UP (ref 3.8–5.2)
RBC # FLD: 12.2 % — SIGNIFICANT CHANGE UP (ref 10.3–14.5)
RBC CASTS # UR COMP ASSIST: 1 /HPF — SIGNIFICANT CHANGE UP (ref 0–4)
SODIUM SERPL-SCNC: 139 MMOL/L — SIGNIFICANT CHANGE UP (ref 135–145)
SP GR SPEC: >1.05 (ref 1.01–1.02)
UROBILINOGEN FLD QL: NEGATIVE — SIGNIFICANT CHANGE UP
WBC # BLD: 8.59 K/UL — SIGNIFICANT CHANGE UP (ref 3.8–10.5)
WBC # FLD AUTO: 8.59 K/UL — SIGNIFICANT CHANGE UP (ref 3.8–10.5)
WBC UR QL: 1 /HPF — SIGNIFICANT CHANGE UP (ref 0–5)

## 2023-07-01 PROCEDURE — 84702 CHORIONIC GONADOTROPIN TEST: CPT

## 2023-07-01 PROCEDURE — 85025 COMPLETE CBC W/AUTO DIFF WBC: CPT

## 2023-07-01 PROCEDURE — 93975 VASCULAR STUDY: CPT | Mod: 26

## 2023-07-01 PROCEDURE — 81001 URINALYSIS AUTO W/SCOPE: CPT

## 2023-07-01 PROCEDURE — G1004: CPT

## 2023-07-01 PROCEDURE — 74177 CT ABD & PELVIS W/CONTRAST: CPT | Mod: ME

## 2023-07-01 PROCEDURE — 96374 THER/PROPH/DIAG INJ IV PUSH: CPT | Mod: XU

## 2023-07-01 PROCEDURE — 76830 TRANSVAGINAL US NON-OB: CPT

## 2023-07-01 PROCEDURE — 83690 ASSAY OF LIPASE: CPT

## 2023-07-01 PROCEDURE — 99285 EMERGENCY DEPT VISIT HI MDM: CPT | Mod: 25

## 2023-07-01 PROCEDURE — 83735 ASSAY OF MAGNESIUM: CPT

## 2023-07-01 PROCEDURE — 87086 URINE CULTURE/COLONY COUNT: CPT

## 2023-07-01 PROCEDURE — 74177 CT ABD & PELVIS W/CONTRAST: CPT | Mod: 26,ME

## 2023-07-01 PROCEDURE — 93975 VASCULAR STUDY: CPT

## 2023-07-01 PROCEDURE — 76830 TRANSVAGINAL US NON-OB: CPT | Mod: 26

## 2023-07-01 PROCEDURE — 80053 COMPREHEN METABOLIC PANEL: CPT

## 2023-07-01 RX ORDER — ONDANSETRON 8 MG/1
4 TABLET, FILM COATED ORAL ONCE
Refills: 0 | Status: COMPLETED | OUTPATIENT
Start: 2023-07-01 | End: 2023-07-01

## 2023-07-01 RX ORDER — KETOROLAC TROMETHAMINE 30 MG/ML
15 SYRINGE (ML) INJECTION ONCE
Refills: 0 | Status: DISCONTINUED | OUTPATIENT
Start: 2023-07-01 | End: 2023-07-01

## 2023-07-01 RX ORDER — ACETAMINOPHEN 500 MG
650 TABLET ORAL ONCE
Refills: 0 | Status: COMPLETED | OUTPATIENT
Start: 2023-07-01 | End: 2023-07-01

## 2023-07-01 RX ORDER — SODIUM CHLORIDE 9 MG/ML
1000 INJECTION INTRAMUSCULAR; INTRAVENOUS; SUBCUTANEOUS ONCE
Refills: 0 | Status: COMPLETED | OUTPATIENT
Start: 2023-07-01 | End: 2023-07-01

## 2023-07-01 RX ADMIN — SODIUM CHLORIDE 1000 MILLILITER(S): 9 INJECTION INTRAMUSCULAR; INTRAVENOUS; SUBCUTANEOUS at 02:26

## 2023-07-01 RX ADMIN — Medication 650 MILLIGRAM(S): at 01:19

## 2023-07-01 RX ADMIN — ONDANSETRON 4 MILLIGRAM(S): 8 TABLET, FILM COATED ORAL at 01:19

## 2023-07-01 NOTE — ED PROVIDER NOTE - CLINICAL SUMMARY MEDICAL DECISION MAKING FREE TEXT BOX
23yoF no PMHx p/ w acute onset RLQ pain and nausea. VS unremarkable, phys exam w/[place] ttp. DDx incl. appendicitis vs. less likely ovarian cysts vs. torsion vs. UTI, will eval w/labs, CT a/p, pain ctrl, reassess. Dispo pending workup. This represents an initial assessment; workup and plan subject to change. - Juana Gaston, PGY-3

## 2023-07-01 NOTE — ED PROVIDER NOTE - PROGRESS NOTE DETAILS
Pt stable, results reviewed, pain OK at this time, agreeable to discharge. Discussed results of workup, importance of follow-up with obgyn, OTC meds for pain, and return precautions with patient who verbalized understanding and agreement. Pt had opportunity to ask questions and address concerns, and results of workup including lab and imaging, and incidental findings, were provided in DC paperwork. Patient is medically clear for DC at this time. -Juana Gaston, PGY-2

## 2023-07-01 NOTE — ED PROVIDER NOTE - PHYSICAL EXAMINATION
Gen: AAOx3, non-toxic young woman lying in bed in NAD  Head: NCAT  HEENT: EOMI, oral mucosa moist, normal conjunctiva  Lung: CTAB, no respiratory distress, no wheezes/rhonchi/rales B/L, speaking in full sentences  CV: RRR, no murmurs, rubs or gallops  Abd: soft, ND, +RLQ pain worst at [place] ttp w/rebound, no guarding, no CVA ttp, no suprapubic/pelvic ttp   MSK: no visible deformities  Neuro: No focal sensory or motor deficits  Skin: Warm, well perfused, no rash  Psych: normal affect.   ~Juana Gaston M.D. Resident

## 2023-07-01 NOTE — ED PROVIDER NOTE - PATIENT PORTAL LINK FT
You can access the FollowMyHealth Patient Portal offered by [facility] by registering at the following website: [email or web address]. By joining [employer]’s FollowMyHealth portal, you will also be able to view your health information using other applications (apps) compatible with our system.

## 2023-07-01 NOTE — ED ADULT NURSE NOTE - NS ED NURSE LEVEL OF CONSCIOUSNESS MENTAL STATUS
I have personally seen and examined this patient.  I have fully participated in the care of this patient. I have reviewed all pertinent clinical information, including history, physical exam, plan and the Resident’s note and agree except as noted. Awake/Alert

## 2023-07-01 NOTE — ED ADULT NURSE NOTE - OBJECTIVE STATEMENT
22 y/o female presents to the ED endorsing RUQ pain radiating to the umbilicus. A/Ox4. Ambulatory without assistive devices at baseline. PMH: denies. Patient endorses nausea. Patient endorses last BM on 6/30/23 and is passing flatus. Patient endorses LMP as "a little over a month ago". Patient denies fever, cough, chills, diarrhea and vomiting. Upon assessment, abdomen is soft, non-distended and tender to the RUQ. Safety and comfort provided.

## 2023-07-01 NOTE — ED PROVIDER NOTE - NS ED ROS FT
GENERAL: No fever or chills, EYES: no change in vision, HEENT: no trouble swallowing or speaking, CARDIAC: no chest pain, PULMONARY: no cough or SOB, GI: +abdominal pain, +nausea, no vomiting, no diarrhea or constipation, : No changes in urination, SKIN: no rashes, NEURO: no headache,  MSK: No joint pain     All other ROS negative unless otherwise specified in HPI.     ~Juana Gaston M.D. Resident

## 2023-07-01 NOTE — ED PROVIDER NOTE - PRINCIPAL DIAGNOSIS
"S/W Jillian/ with [facility] in regards to referral/Dr. Moeller stated,"she needs to know the CPT Code for that procedure." informed her Dr. Moeller is not in clinic will return on 11/14/17, and upon his return this matter will be addressed. Voiced understanding  " Abdominal pain

## 2023-07-01 NOTE — ED PROVIDER NOTE - ATTENDING CONTRIBUTION TO CARE
Hx: pt with RLQ ab pain since 830p tonight, assoc with nausea.      PE: well appearing, nontoxic, +td RLQ.  NO guarding/rebound.    MDM: RLQ ab pain r/o appendicitis vs ks.  check cbc r/o anemia or leukocytosis, check bmp to r/o metabolic derangement and lyte imbalance, ct abdomen r/o appy.

## 2023-07-01 NOTE — ED PROVIDER NOTE - NSFOLLOWUPINSTRUCTIONS_ED_ALL_ED_FT
You were seen and evaluated in the Emergency Department for your abdominal pain. You were evaluated clinically and with laboratory studies.    At this time your clinical evaluation and history do not demonstrate any acute, life-threatening medical conditions warranting emergent treatment.     Please follow up with your obgyn within 1 week. Bring copies of your results with you (provided in your discharge paperwork).     You may take 500-1000 mg acetaminophen (tylenol) every 6 hours, as needed for pain.  You may take 600 mg ibuprofen every 8 hours, with food, as needed for pain.  You can take tylenol and ibuprofen at the same time.     Should you develop new or worsening abdominal pain, fevers, chills, nausea, vomiting, diarrhea, or constipation - please return to the ED for immediate evaluation.     We also strongly encourage you make an appointment with your Primary Care Physician for a comprehensive evaluation of your health.

## 2023-07-01 NOTE — ED ADULT NURSE NOTE - NSFALLUNIVINTERV_ED_ALL_ED
Bed/Stretcher in lowest position, wheels locked, appropriate side rails in place/Call bell, personal items and telephone in reach/Instruct patient to call for assistance before getting out of bed/chair/stretcher/Non-slip footwear applied when patient is off stretcher/[place] to call system/Physically safe environment - no spills, clutter or unnecessary equipment/Purposeful proactive rounding/Room/bathroom lighting operational, light cord in reach

## 2023-07-01 NOTE — ED PROVIDER NOTE - OBJECTIVE STATEMENT
23yoF no PMHx p/w acute onset sharp nonradiating 10/10 RLQ pain starting at 830pm associated w/nausea, has not trialed any medications for pain relief, advised by RN cousin to come to ED due to concern for appendicitis. Denies fever/chills, HA, SOB, CP, palpitations, v/d/c, dysuria/hematuria, hematochezia/melena, numbness/tingling, focal weakness.

## 2023-07-03 LAB
CULTURE RESULTS: SIGNIFICANT CHANGE UP
SPECIMEN SOURCE: SIGNIFICANT CHANGE UP

## 2023-07-05 ENCOUNTER — TRANSCRIPTION ENCOUNTER (OUTPATIENT)
Age: 23
End: 2023-07-05

## 2023-07-07 ENCOUNTER — APPOINTMENT (OUTPATIENT)
Dept: OBGYN | Facility: CLINIC | Age: 23
End: 2023-07-07
Payer: COMMERCIAL

## 2023-07-07 VITALS
BODY MASS INDEX: 35 KG/M2 | WEIGHT: 223 LBS | DIASTOLIC BLOOD PRESSURE: 81 MMHG | SYSTOLIC BLOOD PRESSURE: 133 MMHG | HEIGHT: 67 IN | HEART RATE: 77 BPM

## 2023-07-07 DIAGNOSIS — Z01.419 ENCOUNTER FOR GYNECOLOGICAL EXAMINATION (GENERAL) (ROUTINE) W/OUT ABNORMAL FINDINGS: ICD-10-CM

## 2023-07-07 PROBLEM — Z78.9 OTHER SPECIFIED HEALTH STATUS: Chronic | Status: ACTIVE | Noted: 2023-07-01

## 2023-07-07 PROCEDURE — 99395 PREV VISIT EST AGE 18-39: CPT

## 2023-07-10 ENCOUNTER — TRANSCRIPTION ENCOUNTER (OUTPATIENT)
Age: 23
End: 2023-07-10

## 2023-07-11 LAB — CYTOLOGY CVX/VAG DOC THIN PREP: NORMAL

## 2023-07-13 NOTE — PHYSICAL EXAM
[Chaperone Present] : A chaperone was present in the examining room during all aspects of the physical examination [Appropriately responsive] : appropriately responsive [Alert] : alert [No Acute Distress] : no acute distress [No Lymphadenopathy] : no lymphadenopathy [Regular Rate Rhythm] : regular rate rhythm [No Murmurs] : no murmurs [Clear to Auscultation B/L] : clear to auscultation bilaterally [Soft] : soft [Non-tender] : non-tender [Non-distended] : non-distended [No HSM] : No HSM [No Lesions] : no lesions [No Mass] : no mass [Oriented x3] : oriented x3 [Examination Of The Breasts] : a normal appearance [No Discharge] : no discharge [No Masses] : no breast masses were palpable [Labia Majora] : normal [Labia Minora] : normal [Normal] : normal [Uterine Adnexae] : normal [No Tenderness] : no tenderness [Nl Sphincter Tone] : normal sphincter tone [FreeTextEntry9] : confirms vaginal exam

## 2023-07-13 NOTE — HISTORY OF PRESENT ILLNESS
[No] : Patient does not have concerns regarding sex [Currently Active] : currently active [Women] : women [PapSmeardate] : 02/2021 [FreeTextEntry1] : 05/28/23

## 2023-08-09 ENCOUNTER — ASOB RESULT (OUTPATIENT)
Age: 23
End: 2023-08-09

## 2023-08-09 ENCOUNTER — APPOINTMENT (OUTPATIENT)
Dept: OBGYN | Facility: CLINIC | Age: 23
End: 2023-08-09
Payer: COMMERCIAL

## 2023-08-09 ENCOUNTER — TRANSCRIPTION ENCOUNTER (OUTPATIENT)
Age: 23
End: 2023-08-09

## 2023-08-09 VITALS
DIASTOLIC BLOOD PRESSURE: 77 MMHG | HEART RATE: 74 BPM | SYSTOLIC BLOOD PRESSURE: 117 MMHG | WEIGHT: 216 LBS | BODY MASS INDEX: 33.9 KG/M2 | HEIGHT: 67 IN

## 2023-08-09 DIAGNOSIS — R10.2 PELVIC AND PERINEAL PAIN: ICD-10-CM

## 2023-08-09 PROCEDURE — 76830 TRANSVAGINAL US NON-OB: CPT

## 2023-08-09 PROCEDURE — 99213 OFFICE O/P EST LOW 20 MIN: CPT

## 2023-08-10 ENCOUNTER — APPOINTMENT (OUTPATIENT)
Dept: NEUROLOGY | Facility: CLINIC | Age: 23
End: 2023-08-10
Payer: COMMERCIAL

## 2023-08-10 VITALS
BODY MASS INDEX: 33.9 KG/M2 | DIASTOLIC BLOOD PRESSURE: 83 MMHG | WEIGHT: 216 LBS | HEART RATE: 82 BPM | HEIGHT: 67 IN | SYSTOLIC BLOOD PRESSURE: 115 MMHG

## 2023-08-10 DIAGNOSIS — H46.9 UNSPECIFIED OPTIC NEURITIS: ICD-10-CM

## 2023-08-10 DIAGNOSIS — R90.82 WHITE MATTER DISEASE, UNSPECIFIED: ICD-10-CM

## 2023-08-10 PROCEDURE — 99214 OFFICE O/P EST MOD 30 MIN: CPT

## 2023-08-10 NOTE — DATA REVIEWED
[de-identified] :  MRI brain and C spine w/w/o contrast 7/3/2023 @ [facility] reviewed- C spine MRI normal, brain MRI w/ interval development of several new small T2 hyperintense WM lesions ( R anterior  frontal PV, Anterior R temporal, Medial R temporal) w/ ? associated punctuate enh involving a R temp PV lesion, no CC or posterior fossa involvement.  MRI's 1/2023- -  MRI brain w/w/o- single L parietal subcortical T2 hyperintense  lesion, otherwise no abnormality and no enhancement. no callosal lesion. no infratentorial lesion.  MRI orbits w/w/o-enhancement of intracanicular and intracranial segment of L optic nerve  MRI C and T spine w/w/o- normal cord.  [de-identified] : Serum NMO and MOG antibody negative 1/2023.

## 2023-08-10 NOTE — PHYSICAL EXAM
[FreeTextEntry1] : PHYSICAL EXAM Constitutional: Alert, no acute distress  Psychiatric: appropriate affect and mood Pulmonary: No respiratory distress, stable on room air  NEUROLOGICAL EXAM Mental status: The patient is alert, attentive and conversational memory intact Speech/language: No dysarthria Cranial nerves: CN II: PERRL, No RAPD. VFC intact/full. VA 20/20 OU and 20/25 OD w/ 20/20 on PH.  CN III, IV, VI: EOMI, no nystagmus, no ptosis. Jerky pursuit CN V: Facial sensation is intact to pinprick in all 3 divisions bilaterally. CN VII: Face is symmetric with normal eye closure and smile. CN VII: Hearing is normal to rubbing fingers CN IX, X: Palate elevates symmetrically. Phonation is normal. CN XI: Head turning and shoulder shrug are intact CN XII: Tongue is midline with normal movements and no atrophy. Motor: Strength is full bilaterally. 5/5 muscle power in bilateral UE and LE. Reflexes: R L  Biceps 2+ 2+  Patellar 2+ 2+  Achilles 2+ 2+  Plantar responses-  R down  L down Sensory: Intact sensations to LT/PP/VIB in UE and LE.  Coordination: There is no dysmetria on finger-to-nose and heel to shin. .  Gait/Stance: Posture is normal. Gait is steady with normal steps, base, arm swing, and turning. Heel and toe walking are normal. Tandem gait is normal. Romberg is absent.

## 2023-08-10 NOTE — HISTORY OF PRESENT ILLNESS
[FreeTextEntry1] : INTERIM HX 08/10/2023: MRI brain and C spine w/w/o contrast 7/3/2023 @ [facility] reviewed- C spine MRI normal, brain MRI w/ interval development of several new small T2 hyperintense WM lesions ( R anterior  frontal PV, Anterior R temporal, Medial R temporal) w/ ? associated punctuate enh involving a R temp PV lesion, no CC or posterior fossa involvement.  She remains stable. No new symptoms.   INTERIM HX 05/05/2023:  2.5 weeks ago had numbness over Right half of face, neck and entire R arm, no tingling, RUE felt heavy, no facial droop, got worse over a few days, now resolved.  She is left handed. Bad migraine 3-4 x /week. Did not take sumatriptan or nortriptyline. Started supplements. [place] trip in 2 weeks.  INTERIM HX 03/30/2023: Serum NMO and MOG antibody negative 1/2023. Seen by Dr Montgomery, 2/6, VA 20/20 OD and 20/25 OS with PH, trace RAPD OS, no disc edema, normal OCT, "non specific" HVF defect OS.  Recently seen by Dr Montgomery 3/27 for pain in R eye ("swollen and tight") with blurry vision x 1.5 weeks, associated with irritation, VA 20/20 OU, trace APD OS, normal fundus exam, HVF and OCT normal, no evidence of optic neuritis in R eye. Current symptom: pressure around the right eye, intermittent blurry vision (lasts 5 min-30 min). strain over the right side of the head. No clear triggers. possibly some photophobia, happens more at work. Had a few migraines in the last week.  Migraine freq:  4 x /week. Also gets tension headaches. Sleep is good.   -------------------------------------------------------------------------------------------------------------------------------------------------------------------------------- HPI (initial visit Jan 26, 2023)- LEIGHA HARDIN is a 22 year old woman recently admitted to [facility] 1/18-1/22/2023 for L optic neuritis, s/p 5 days IV solumedrol with improvement in symptoms.  Her symptoms began 1/1/2023, with pain on moving L eye. This was followed by cloudiness over  L eye- like a film over eye. Of note, she also tested positive for COVID around the same time. She was seen by ophthalmologist, Dr. Neri Patiño, who recommended she present to ED for optic neuritis, given enhancement of intracanicular and intracranial segment of L optic nerve on outside MRI scan. She also had an outside brain MRI which showed a single L parietal subcortical T2 hyperintense  lesion, otherwise no abnormality and no enhancement. MRI C and T spine did not show any cord signal abnormality. She completed 5 days of IV solumedrol with symptom improvement. She was discharged home on prednisone taper (60 mg QD, taper down by 10 mg daily).   Labs in hospital reviewed- serum NMO and MOG ab pending.  ESR 3, CRP < 3. HbA1c 5.4%, TSH normal. B12 692. VitD 31. SSA/SSB, DsDNA, Joyce-1 ab, Sm/RNP ab, RF, ANCA WNL FLAKITO 1:80, speckled  Vision is 20-30% better. She is still on prednisone 30 mg QD. Pain with eye movements has resolved. 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   Hx of COVID-19 infection 12/2021.  She has a hx of migraine headaches-since 19 years of age, can be one sided. "hurt so bad". no vision changes. + photophobia. no phonophobia. no nausea. no dizziness. can last a day. no triggers. Also has tension type headache.   no family Ms or autoimmune disease.

## 2023-08-10 NOTE — ASSESSMENT
[FreeTextEntry1] : Assessment/Plan: 23 year old female w/ hx of migraine headaches with aura and left optic neuritis 1/2023 (CIS vs post COVID, s/p steroids w/ complete resolution), developed new onset R facial/RUE numbness/weakness 4/2023 w/ repeat MRI brain showing interval development of new WM lesion most c/w demyelination and meeting the MS Mcdonalds criteria for DIS and DIT. Her presentation is now c/w clinically definite multiple sclerosis.   Relapsing MS, dx'd 8/2023  I discussed the pathophysiology of Multiple Sclerosis, its disease course and management. We discussed the different options for disease modifying therapy. I explained to her that the goal of treatment is to prevent any new clinical relapses, new lesions on MRI scans and to slow down disease progression/disability. In addition to discussing disease modifying therapies, we reviewed available therapies for symptomatic management for spasticity, neuropathic pain, bladder symptoms, fatigue etc. I also stressed on the importance of healthy eating habits, routine physical therapy and vitamin D supplementation.  Plan- [] Start Vitamin D3 5000 IU daily [] DMT labs ordered Will follow up in 2 weeks to discuss DMT options.    # Migraine headaches with aura- since age 19.  HA freq: 4 x /week. Also experiences tension headaches.  Recent neuro ophtho evaluation normal 3/27/2023.  Plan:- [] Abortive therapy: sumatriptan 50 mg tablet, take 1 tablet at the onset of the headache. Can repeat dose in 2 hours if needed. Limit dose to 2 tab/day and 5 tab/week. Discussed the side effects drowsiness and nausea/diarrhea. [] Over the counter preventative therapies discussed, which include Magnesium 400 mg QD (discussed potential side effect of diarrhea), riboflavin 400 mg QD and Coenzyme Q10 100 mg daily. [] Start amitriptyline 25 mg qhs for migraine prevention Headache education provided: [] Stay well hydrated [] Limit excessive caffeine and alcohol intake [] Maintain good sleep hygiene. Follow a consistent sleep and wake schedule.  [] Practice good eating habits. Avoid skipping meals.  [] Try to avoid any known triggers [] Avoid excessive use of over the counter pain medications, as they can cause medication overuse headaches  [] Keep a headache diary [] Relaxation techniques, biofeedback, massage therapy, acupunctures, and heating pads may be effective  Return to clinic 2 weeks  The above plan was discussed with LEIGHA WILFRED in great detail. LEIGHA HARDIN verbalized understanding and agrees with plan as detailed above. Patient was provided education and counselling on current diagnosis/symptoms. She was advised to call our clinic at [phone] for any new or worsening symptoms, or with any questions or concerns. In case of acute onset of neurological symptoms or worsening presentation, patient was advised to present to nearest emergency room for further evaluation. LEIGHA HARDIN expressed understanding and all her questions/concerns were addressed.  Kath De Los Santos M.D

## 2023-08-11 LAB
25(OH)D3 SERPL-MCNC: 28.4 NG/ML
ALBUMIN SERPL ELPH-MCNC: 4.9 G/DL
ALP BLD-CCNC: 60 U/L
ALT SERPL-CCNC: 46 U/L
AST SERPL-CCNC: 29 U/L
BILIRUB DIRECT SERPL-MCNC: 0.1 MG/DL
BILIRUB INDIRECT SERPL-MCNC: 0.4 MG/DL
BILIRUB SERPL-MCNC: 0.5 MG/DL
BUN SERPL-MCNC: 13 MG/DL
CREAT SERPL-MCNC: 0.67 MG/DL
DEPRECATED KAPPA LC FREE/LAMBDA SER: 1.1 RATIO
EGFR: 125 ML/MIN/1.73M2
HBV CORE IGG+IGM SER QL: NONREACTIVE
HBV CORE IGM SER QL: NONREACTIVE
HBV SURFACE AB SER QL: NONREACTIVE
HBV SURFACE AG SER QL: NONREACTIVE
IGA SER QL IEP: 189 MG/DL
IGG SER QL IEP: 925 MG/DL
IGM SER QL IEP: 110 MG/DL
KAPPA LC CSF-MCNC: 0.79 MG/DL
KAPPA LC SERPL-MCNC: 0.87 MG/DL
PROT SERPL-MCNC: 7.5 G/DL
VZV AB TITR SER: POSITIVE
VZV IGG SER IF-ACNC: 649.9 INDEX
VZV IGM SER IF-ACNC: <0.91 INDEX

## 2023-08-14 LAB
M TB IFN-G BLD-IMP: NEGATIVE
QUANTIFERON TB PLUS MITOGEN MINUS NIL: 9.13 IU/ML
QUANTIFERON TB PLUS NIL: 0.02 IU/ML
QUANTIFERON TB PLUS TB1 MINUS NIL: 0 IU/ML
QUANTIFERON TB PLUS TB2 MINUS NIL: 0.01 IU/ML

## 2023-08-15 NOTE — HISTORY OF PRESENT ILLNESS
[FreeTextEntry1] : 23 year old presents for a follow-up visit. Pt c/o pelvic pain and ovarian cyst.  Today's sono shows:  Uterus   Position:   Anteverted  Size (cm)    L:  8.23      W:   4.93       H:  3.51  Description:   Homogeneous. ---------------------------------------------------------------------- Endometrium   Endometrium:          Secretory  Thickness(mm):        13.31  ---------------------------------------------------------------------- Cervix   Appears unremarkable. ---------------------------------------------------------------------- [place]   Posterior free fluid: 3.4 x 5.5 x 2.3 cm. ---------------------------------------------------------------------- Right Ovary   Size (cm)    L:  4.17      W:   4.81       H:  2.75  Vol (ml):    28.88  Type:   Corpus Luteum  Size (cm)    L:  2.2       W:   2.15       H:  2.17  Vol (ml):    5.37 ---------------------------------------------------------------------- Left Ovary  Size (cm)    L:  3.98      W:   2.56       H:  2.6  Vol (ml):    13.87 ---------------------------------------------------------------------- Comments   A transvaginal ultrasound was performed.   The uterus appears anteverted and homogeneous.   The endometrium measures: 13.31 mm and appears  secretory.   The cervix is unremarkable.   The right ovary is visualized with a 2.2 x 2.15 x 2.17 cm  corpus luteal cyst.   The left ovary is wnl.   Posterior cul de sac free fluid is visualized measuring:  3.4 x 5.5 x 2.3 cm.   Most likely suggesting ruptured cyst.

## 2023-08-15 NOTE — PLAN
[FreeTextEntry1] : Discussed sono results with pt. Offered OCP but pt declined. RTO for annual visit.

## 2023-08-19 LAB
JCV INDEX: 3.42
STRATIFY JCV ANTIBODY: POSITIVE

## 2023-08-22 ENCOUNTER — NON-APPOINTMENT (OUTPATIENT)
Age: 23
End: 2023-08-22

## 2023-08-22 ENCOUNTER — APPOINTMENT (OUTPATIENT)
Dept: NEUROLOGY | Facility: CLINIC | Age: 23
End: 2023-08-22
Payer: COMMERCIAL

## 2023-08-22 VITALS
SYSTOLIC BLOOD PRESSURE: 116 MMHG | BODY MASS INDEX: 33.9 KG/M2 | HEIGHT: 67 IN | DIASTOLIC BLOOD PRESSURE: 78 MMHG | HEART RATE: 73 BPM | WEIGHT: 216 LBS

## 2023-08-22 PROCEDURE — 99214 OFFICE O/P EST MOD 30 MIN: CPT

## 2023-08-22 NOTE — PHYSICAL EXAM
[FreeTextEntry1] : Alert, attentive, in no acute distress  Appropriate affect and mood No dysarthria No definite facial asymmetry appreciable. No ptosis

## 2023-08-22 NOTE — ASSESSMENT
[FreeTextEntry1] : Assessment/Plan: 23 year old female w/ hx of migraine headaches with aur, diagnosed with relapsing MS 8/2023 (Left ON 1/2023, R face/arm numbness 4/2023 w/ new + active lesion on brain MRI, + DIT and DIS).     Discussed DMT options. Given young age and 2 relapses within the last 6 months, I would recommend a highly effective therapy. JCV ab +. Discussed options of Kesimpta or Ocrevus. Reviewed side effect profile. Patient felt most comfortable proceeding with Kesimpta at this time.   # Relapsing MS, dx'd 8/2023 Plan:  1. Diagnostic Plan/Imaging: Repeat MRI brain and C spine w.w.o contrast 3 months after start of DMT, for new baseline.   2. Disease Modifying therapy plan: Kesimpta start form completed. Kesimpta ordered. Administration instructions given. Advised to take extra strength Tylenol 30 min - 1 hr prior to first infection.  Loading dose 20 mg SQ: Day 1, 7 and then 14. Maintanence dose 20 mg SQ: Every 4 weeks  3. Symptomatic therapy plan: [] Vitamin D3 and B12 supplementations. Vitamin D3 50,000 IU weekly x 12 weeks and then followed by 2000 IU QD (OTC).    # Migraine headaches with aura- since age 19. (NOT addressed at this visit) HA freq: 4 x /week. Also experiences tension headaches. Recent neuro ophtho evaluation normal 3/27/2023. Plan:- [] Abortive therapy: sumatriptan 50 mg tablet, take 1 tablet at the onset of the headache. Can repeat dose in 2 hours if needed. Limit dose to 2 tab/day and 5 tab/week. Discussed the side effects drowsiness and nausea/diarrhea. [] Over the counter preventative therapies discussed, which include Magnesium 400 mg QD (discussed potential side effect of diarrhea), riboflavin 400 mg QD and Coenzyme Q10 100 mg daily. [] Start amitriptyline 25 mg qhs for migraine prevention Headache education provided: [] Stay well hydrated [] Limit excessive caffeine and alcohol intake [] Maintain good sleep hygiene. Follow a consistent sleep and wake schedule. [] Practice good eating habits. Avoid skipping meals. [] Try to avoid any known triggers [] Avoid excessive use of over the counter pain medications, as they can cause medication overuse headaches [] Keep a headache diary [] Relaxation techniques, biofeedback, massage therapy, acupunctures, and heating pads may be effective  Return to clinic 2-3 months  The above plan was discussed with LEIGHA WILFRED in great detail. LEIGHA HARDIN verbalized understanding and agrees with plan as detailed above. Patient was provided education and counselling on current diagnosis/symptoms. She was advised to call our clinic at [phone] for any new or worsening symptoms, or with any questions or concerns. In case of acute onset of neurological symptoms or worsening presentation, patient was advised to present to nearest emergency room for further evaluation. LEIGHA HARDIN expressed understanding and all her questions/concerns were addressed.  Kath De Los Santos M.D.

## 2023-08-22 NOTE — DATA REVIEWED
ONCOLOGY / HEMATOLOGY FOLLOW  UP NOTE:       ONCOLOGY PROBLEMS:  Patient Active Problem List    Diagnosis Date Noted   • Malignant neoplasm of ascending colon (CMS/HCC)      Priority: Low     Stage II colon cancer diagnosed in July of 2004   Treated with six months of adjuvant chemotherapy consisting of 5-FU and leucovorin  Completing treatment January 2005  Last Colonoscopy April 2011          Chief Complaint   Patient presents with   • Colon Cancer           INTERVAL HISTORY:  Ted Barba was diagnosed with Stage II ascending colon cancer in July of 2004.  Treated with six months of adjuvant chemotherapy consisting of 5-FU and leucovorin completing treatment January 2005.    He  continues to feel well and denies any fever, chills, night sweats or weight loss.  he also denies any new bone pain.  he has not noticed any increase in fatigue or numbness.  he does not report any nausea, vomiting, diarrhea or unusual headaches.  he breathing remains stable with no significant cough or chest pain.    Since his last visit, Ted Barba has had no infections or hospitalizations. Most recent colonoscopy was in 4/2014.  Next in 2019.    Please see review of system below and the positive findings are highlighted.     PAST MEDICAL HISTORY:    Past Medical History:   Diagnosis Date   • BPH    • Hypertension    • Malignant neoplasm of ascending colon (CMS/HCC) 07/12/2004    mod diff adenocarcinoma, stage II   • Osteoporosis         ALLERGIES: no known allergies.     Current Outpatient Prescriptions   Medication Sig   • vitamin - therapeutic multivitamins w/minerals (CENTRUM SILVER,THERA-M) TABS Take 1 tablet by mouth daily.     • Calcium 600 MG tablet Take 1 tablet by mouth 2 times daily.   • alendronate (FOSAMAX) 70 MG tablet Take 1 tablet by mouth every 7 days.   • Hydrochlorothiazide 12.5 MG tablet Take 12.5 mg by mouth daily.   • Dispense (CHECK, UNKNOWN CONCENTRATION) Vitamin D- 1 tablet twice daily   • Aspirin 81 MG  [de-identified] :  MRI brain and C spine w/w/o contrast 7/3/2023 @ [facility] reviewed- C spine MRI normal, brain MRI w/ interval development of several new small T2 hyperintense WM lesions ( R anterior  frontal PV, Anterior R temporal, Medial R temporal) w/ ? associated punctuate enh involving a R temp PV lesion, no CC or posterior fossa involvement.  MRI's 1/2023- -  MRI brain w/w/o- single L parietal subcortical T2 hyperintense  lesion, otherwise no abnormality and no enhancement. no callosal lesion. no infratentorial lesion.  MRI orbits w/w/o-enhancement of intracanicular and intracranial segment of L optic nerve  MRI C and T spine w/w/o- normal cord.  tablet Take 81 mg by mouth daily.   • Dispense (CHECK, UNKNOWN CONCENTRATION) Fish Oil Capsule- one daily     No current facility-administered medications for this visit.        FAMILY & SOCIAL HISTORY:  Reviewed in patient's Epic chart.    REVIEW OF SYSTEMS:   Millie Gonzalze MA  8/10/2017 10:04 AM  Sign at close encounter  REVIEW OF SYSTEMS:  GENERAL:  Patient denies fevers, chills, night sweats, excessive fatigue, anorexia, weight loss, weakness, hot flashes  EYES:  Patient denies blurred vision, double vision, pain, burning & itching.   NEUROLOGIC:  Patient denies headache, dizziness, numbness, tingling, loss of balance, insomnia.  GASTROINTESTINAL:  Patient denies nausea, vomiting, diarrhea, abdominal pain, constipation, blood in stool, indigestion/heartburn.  CARDIOVASCULAR:  Patient denies chest pain, palpitations.  SKIN:  Patient denies skin rashes, bruising, persistent itching.  MUSCULOSKELETAL:  Patient denies joint pain, bone pain, leg and ankle swelling.  ENT/MOUTH:  Patient denies dysphagia, dry mouth, sore throat, sores on tongue, mouth sores, sinus drainage, hearing loss  :  Patient denies urgency, painful urination, urinary frequency, blood in urine, nocturia  RESPIRATORY:  Patient denies wheezing, frequent cough, shortness of breath.  PSYCH: Patient denies anxiety, depression       PHYSICAL EXAMINATION:    Ted Barba is a 72 year old male who is alert, oriented times 3, in no apparent distress.  VITALS:    Oncology Encounter Vitals [08/10/17 0956]   ONC OP Encounter Vitals Group      /72      Pulse 67      Resp 14      Temp 97.8 °F (36.6 °C)      Temp src Oral      SpO2 99 %      Weight 158 lb (71.7 kg)      Height       Pain Score       Pain Location       Pain Education?       BSA (Calculated - m2) - Evan & Evan       BMI (Calculated)        ECO - Fully active, able to carry on all predisease activities without restrictions.   General: The patient is alert, well-developed,  [de-identified] : Serum NMO and MOG antibody negative 1/2023. well-nourished, no distress.  Skin: Warm, normal color, normal texture, normal turgor and without rash.  Head: Normocephalic, atraumatic.  Neck: Supple with no significant adenopathy.  Eyes: Normal conjunctivae and sclerae. Pupils equal, round, reactive to light and accommodation. Extraocular movements intact.  ENT: Mucous membranes are dry and pale. Normal nose,mouth and throat.  Cardiovascular: Regular rate and rhythm, no murmurs, gallops or rubs.  Respiratory: Normal respiratory effort. Clear to auscultation. No wheezes, rales, or rhonchi.  Abdomen: Soft and non tender.  No hepato-splenomegaly or masses.  Extremities: No clubbing, no cyanosis, no edema. Normal muscle tone and development bilaterally.  Lymph: No cervical, supraclavicular, axillary, inguinal lymphadenopathy.  Neurologic: Motor strength normal. Coordination normal. No tremor noted.  Psychiatric: Cooperative. Appropriate mood and affect. Normal judgment.       LABS:    Lab Results   Component Value Date/Time    WBC 5.0 08/07/2017 09:10 AM    RBC 4.72 08/07/2017 09:10 AM    HGB 15.1 08/07/2017 09:10 AM    HCT 42.7 08/07/2017 09:10 AM     08/07/2017 09:10 AM     08/27/2013 08:48 AM     08/26/2011 09:15 AM    MCV 90.5 08/07/2017 09:10 AM    MCH 32.0 08/07/2017 09:10 AM    MCHC 35.4 08/07/2017 09:10 AM    RDWCV 12.9 08/07/2017 09:10 AM    SEG 47 08/07/2017 09:10 AM    SEG 59 08/26/2011 09:15 AM    TLYMPH 37 08/07/2017 09:10 AM    PMON 8 08/07/2017 09:10 AM    PEOS 7 08/07/2017 09:10 AM    PBASO 1 08/07/2017 09:10 AM    ANEUT 2.3 08/07/2017 09:10 AM    ANEUT 3.1 08/26/2011 09:15 AM    ALYMS 1.9 08/07/2017 09:10 AM    KAVITA 0.4 08/07/2017 09:10 AM    AEOS 0.4 08/07/2017 09:10 AM    ABASO 0.1 08/07/2017 09:10 AM     Lab Results   Component Value Date/Time    GLUCOSE 102 (H) 08/07/2017 09:10 AM    SODIUM 138 08/07/2017 09:10 AM    POTASSIUM 4.0 08/07/2017 09:10 AM    CHLORIDE 102 08/07/2017 09:10 AM    BUN 20 08/07/2017 09:10 AM     CREATININE 1.01 08/07/2017 09:10 AM    CALCIUM 8.9 08/07/2017 09:10 AM    CALCIUM 9.4 08/26/2011 09:15 AM    ALBUMIN 4.0 08/07/2017 09:10 AM    AST 21 08/07/2017 09:10 AM    ALKPT 54 08/07/2017 09:10 AM    GPT 30 08/07/2017 09:10 AM    ANIONGAP 10 08/07/2017 09:10 AM    BCRAT 20 08/07/2017 09:10 AM    GLOB 3.4 08/07/2017 09:10 AM    AGR 1.2 08/07/2017 09:10 AM       CEA (ng/mL)   Date Value   08/07/2017 1.2        RADIOLOGICAL DATA:none    ASSESSMENT/PLAN/RECOMMENDATIONS:  Mr. Ted Barba is a 72 year old year old male with the following hematology/oncology problems:    ASSESSMENT: (153.6) Malignant neoplasm of ascending colon  (primary encounter diagnosis)  Comment: Based upon history, physical examination, laboratory studies reviewed during this visit, there appears to be no obvious evidence of disease..  He remains in continued complete remission and requires no treatment for the cancer at this time..  Plan: Continue to observe and monitor.  No intervention is needed at this time  RTC 1 year with:  CBC & AUTO DIFFERENTIAL, COMPREHENSIVE         METABOLIC PANEL, CARCINOEMBRYONIC ANTIGEN        Prior to visit       The patient is instructed to call earlier if any questions or concerns.    Thank you for allowing me to participate in his care and please feel free to call if any questions or concerns.

## 2023-08-28 RX ORDER — OFATUMUMAB 20 MG/.4ML
20 INJECTION, SOLUTION SUBCUTANEOUS
Qty: 1 | Refills: 11 | Status: ACTIVE | COMMUNITY
Start: 2023-08-22 | End: 1900-01-01

## 2023-08-30 ENCOUNTER — NON-APPOINTMENT (OUTPATIENT)
Age: 23
End: 2023-08-30

## 2023-09-05 ENCOUNTER — TRANSCRIPTION ENCOUNTER (OUTPATIENT)
Age: 23
End: 2023-09-05

## 2023-09-06 ENCOUNTER — TRANSCRIPTION ENCOUNTER (OUTPATIENT)
Age: 23
End: 2023-09-06

## 2023-09-13 ENCOUNTER — NON-APPOINTMENT (OUTPATIENT)
Age: 23
End: 2023-09-13

## 2023-09-13 ENCOUNTER — TRANSCRIPTION ENCOUNTER (OUTPATIENT)
Age: 23
End: 2023-09-13

## 2023-09-18 ENCOUNTER — TRANSCRIPTION ENCOUNTER (OUTPATIENT)
Age: 23
End: 2023-09-18

## 2023-10-04 ENCOUNTER — TRANSCRIPTION ENCOUNTER (OUTPATIENT)
Age: 23
End: 2023-10-04

## 2023-11-10 ENCOUNTER — TRANSCRIPTION ENCOUNTER (OUTPATIENT)
Age: 23
End: 2023-11-10

## 2023-11-21 ENCOUNTER — TRANSCRIPTION ENCOUNTER (OUTPATIENT)
Age: 23
End: 2023-11-21

## 2023-12-07 ENCOUNTER — TRANSCRIPTION ENCOUNTER (OUTPATIENT)
Age: 23
End: 2023-12-07

## 2023-12-14 ENCOUNTER — APPOINTMENT (OUTPATIENT)
Dept: NEUROLOGY | Facility: CLINIC | Age: 23
End: 2023-12-14
Payer: COMMERCIAL

## 2023-12-14 VITALS — DIASTOLIC BLOOD PRESSURE: 81 MMHG | HEART RATE: 79 BPM | SYSTOLIC BLOOD PRESSURE: 124 MMHG

## 2023-12-14 PROCEDURE — 99214 OFFICE O/P EST MOD 30 MIN: CPT

## 2023-12-14 RX ORDER — OFATUMUMAB 20 MG/.4ML
20 INJECTION, SOLUTION SUBCUTANEOUS
Qty: 3 | Refills: 0 | Status: DISCONTINUED | COMMUNITY
Start: 2023-08-22 | End: 2023-12-14

## 2023-12-14 RX ORDER — SUMATRIPTAN 50 MG/1
50 TABLET, FILM COATED ORAL
Qty: 8 | Refills: 3 | Status: ACTIVE | COMMUNITY
Start: 2023-03-30 | End: 1900-01-01

## 2023-12-14 NOTE — ASSESSMENT
[FreeTextEntry1] : Assessment/Plan: 23 year old female w/ hx of migraine headaches with aura, diagnosed with relapsing MS 8/2023 (Left ON 1/2023, R face/arm numbness 4/2023 w/ new + active lesion on brain MRI, + DIT and DIS).  # Relapsing MS, dx'd 8/2023, on kesimpta since 9/2023 Plan: 1. Diagnostic Plan/Imaging: Repeat MRI brain and C spine w.w.o contrast 3 months after start of DMT, for new baseline.- ordered today  2. Disease Modifying therapy plan:  Continue Kesimpta 20 mg SQ: Every 4 weeks Kesimpta labs every 6 months - ordered  3. Symptomatic therapy plan: [] Vitamin D3 and B12 supplementations.   # Migraine headaches with aura- since age 19.  HA freq: Daily. Also experiences tension headaches. Recent neuro ophtho evaluation normal 3/27/2023. Plan:- [] Will prescribe course of medrol dose pack. Discussed potential side effects including insomnia, mood disturbances and GI disturbances (can take OTC pepcid as needed) [] Abortive therapy: sumatriptan 50 mg tablet, take 1 tablet at the onset of the headache. Can repeat dose in 2 hours if needed. Limit dose to 2 tab/day and 5 tab/week. Discussed the side effects drowsiness and nausea/diarrhea. [] Over the counter preventative therapies discussed, which include Magnesium 400 mg QD (discussed potential side effect of diarrhea), riboflavin 400 mg QD and Coenzyme Q10 100 mg daily. [] Start amitriptyline 25 mg qhs for migraine prevention   Return to clinic 3 months  The above plan was discussed with LEIGHA HARDIN in great detail. LEIGHA HARDIN verbalized understanding and agrees with plan as detailed above. Patient was provided education and counselling on current diagnosis/symptoms. She was advised to call our clinic at [phone] for any new or worsening symptoms, or with any questions or concerns. LEIGHA HARDIN expressed understanding and all her questions/concerns were addressed.  Kath De Los Santos M.D.

## 2023-12-14 NOTE — HISTORY OF PRESENT ILLNESS
[FreeTextEntry1] : INTERIM HX 12/14/2023: On kesimpta since 9/5/2023. Felt sick after first dose. HA are now daily headaches in the last 2 months. Never started amitriptyline.   INTERIM HX 08/22/2023: JCV ab + 3.42. Vitamin D 28.4. Quant Tb neg, LFT normal. HEp B panel neg. CBC normal. VZV IgG +. Reviewed DMT options at this visit. Here with mother. Answered all questions. Pt stable.   INTERIM HX 08/10/2023: MRI brain and C spine w/w/o contrast 7/3/2023 @ [facility] reviewed- C spine MRI normal, brain MRI w/ interval development of several new small T2 hyperintense WM lesions ( R anterior  frontal PV, Anterior R temporal, Medial R temporal) w/ ? associated punctuate enh involving a R temp PV lesion, no CC or posterior fossa involvement.  She remains stable. No new symptoms.   INTERIM HX 05/05/2023:  2.5 weeks ago had numbness over Right half of face, neck and entire R arm, no tingling, RUE felt heavy, no facial droop, got worse over a few days, now resolved.  She is left handed. Bad migraine 3-4 x /week. Did not take sumatriptan or nortriptyline. Started supplements. [place] trip in 2 weeks.  INTERIM HX 03/30/2023: Serum NMO and MOG antibody negative 1/2023. Seen by Dr Montgomery, 2/6, VA 20/20 OD and 20/25 OS with PH, trace RAPD OS, no disc edema, normal OCT, "non specific" HVF defect OS.  Recently seen by Dr Montgomery 3/27 for pain in R eye ("swollen and tight") with blurry vision x 1.5 weeks, associated with irritation, VA 20/20 OU, trace APD OS, normal fundus exam, HVF and OCT normal, no evidence of optic neuritis in R eye. Current symptom: pressure around the right eye, intermittent blurry vision (lasts 5 min-30 min). strain over the right side of the head. No clear triggers. possibly some photophobia, happens more at work. Had a few migraines in the last week.  Migraine freq:  4 x /week. Also gets tension headaches. Sleep is good.   -------------------------------------------------------------------------------------------------------------------------------------------------------------------------------- HPI (initial visit Jan 26, 2023)- LEIGHA HARDIN is a 22 year old woman recently admitted to [facility] 1/18-1/22/2023 for L optic neuritis, s/p 5 days IV solumedrol with improvement in symptoms.  Her symptoms began 1/1/2023, with pain on moving L eye. This was followed by cloudiness over  L eye- like a film over eye. Of note, she also tested positive for COVID around the same time. She was seen by ophthalmologist, Dr. Neri Patiño, who recommended she present to ED for optic neuritis, given enhancement of intracanicular and intracranial segment of L optic nerve on outside MRI scan. She also had an outside brain MRI which showed a single L parietal subcortical T2 hyperintense  lesion, otherwise no abnormality and no enhancement. MRI C and T spine did not show any cord signal abnormality. She completed 5 days of IV solumedrol with symptom improvement. She was discharged home on prednisone taper (60 mg QD, taper down by 10 mg daily).   Labs in hospital reviewed- serum NMO and MOG ab pending.  ESR 3, CRP < 3. HbA1c 5.4%, TSH normal. B12 692. VitD 31. SSA/SSB, DsDNA, Joyce-1 ab, Sm/RNP ab, RF, ANCA WNL FLAKITO 1:80, speckled  Vision is 20-30% better. She is still on prednisone 30 mg QD. Pain with eye movements has resolved. 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   Hx of COVID-19 infection 12/2021.  She has a hx of migraine headaches-since 19 years of age, can be one sided. "hurt so bad". no vision changes. + photophobia. no phonophobia. no nausea. no dizziness. can last a day. no triggers. Also has tension type headache.   no family Ms or autoimmune disease.

## 2023-12-14 NOTE — DATA REVIEWED
[de-identified] :  MRI brain and C spine w/w/o contrast 7/3/2023 @ [facility] reviewed- C spine MRI normal, brain MRI w/ interval development of several new small T2 hyperintense WM lesions ( R anterior  frontal PV, Anterior R temporal, Medial R temporal) w/ ? associated punctuate enh involving a R temp PV lesion, no CC or posterior fossa involvement.  MRI's 1/2023- -  MRI brain w/w/o- single L parietal subcortical T2 hyperintense  lesion, otherwise no abnormality and no enhancement. no callosal lesion. no infratentorial lesion.  MRI orbits w/w/o-enhancement of intracanicular and intracranial segment of L optic nerve  MRI C and T spine w/w/o- normal cord.  [de-identified] : Serum NMO and MOG antibody negative 1/2023.

## 2023-12-15 ENCOUNTER — TRANSCRIPTION ENCOUNTER (OUTPATIENT)
Age: 23
End: 2023-12-15

## 2023-12-15 LAB
ALBUMIN SERPL ELPH-MCNC: 4.9 G/DL
ALP BLD-CCNC: 60 U/L
ALT SERPL-CCNC: 55 U/L
ANION GAP SERPL CALC-SCNC: 8 MMOL/L
AST SERPL-CCNC: 36 U/L
BASOPHILS # BLD AUTO: 0.02 K/UL
BASOPHILS NFR BLD AUTO: 0.3 %
BILIRUB SERPL-MCNC: 0.3 MG/DL
BUN SERPL-MCNC: 12 MG/DL
CALCIUM SERPL-MCNC: 9.5 MG/DL
CHLORIDE SERPL-SCNC: 101 MMOL/L
CO2 SERPL-SCNC: 27 MMOL/L
CREAT SERPL-MCNC: 0.58 MG/DL
EGFR: 130 ML/MIN/1.73M2
EOSINOPHIL # BLD AUTO: 0.05 K/UL
EOSINOPHIL NFR BLD AUTO: 0.8 %
GLUCOSE SERPL-MCNC: 88 MG/DL
HCT VFR BLD CALC: 38.4 %
HGB BLD-MCNC: 12.8 G/DL
IMM GRANULOCYTES NFR BLD AUTO: 0.3 %
LYMPHOCYTES # BLD AUTO: 2.27 K/UL
LYMPHOCYTES NFR BLD AUTO: 36.1 %
MAN DIFF?: NORMAL
MCHC RBC-ENTMCNC: 31.2 PG
MCHC RBC-ENTMCNC: 33.3 GM/DL
MCV RBC AUTO: 93.7 FL
MONOCYTES # BLD AUTO: 0.26 K/UL
MONOCYTES NFR BLD AUTO: 4.1 %
NEUTROPHILS # BLD AUTO: 3.66 K/UL
NEUTROPHILS NFR BLD AUTO: 58.4 %
PLATELET # BLD AUTO: 232 K/UL
POTASSIUM SERPL-SCNC: 4.4 MMOL/L
PROT SERPL-MCNC: 7.2 G/DL
RBC # BLD: 4.1 M/UL
RBC # FLD: 12.1 %
SODIUM SERPL-SCNC: 136 MMOL/L
WBC # FLD AUTO: 6.28 K/UL

## 2024-01-03 ENCOUNTER — APPOINTMENT (OUTPATIENT)
Dept: MRI IMAGING | Facility: CLINIC | Age: 24
End: 2024-01-03
Payer: COMMERCIAL

## 2024-01-03 ENCOUNTER — OUTPATIENT (OUTPATIENT)
Dept: OUTPATIENT SERVICES | Facility: HOSPITAL | Age: 24
LOS: 1 days | End: 2024-01-03
Payer: COMMERCIAL

## 2024-01-03 DIAGNOSIS — Z00.8 ENCOUNTER FOR OTHER GENERAL EXAMINATION: ICD-10-CM

## 2024-01-03 PROCEDURE — 72156 MRI NECK SPINE W/O & W/DYE: CPT | Mod: 26

## 2024-01-03 PROCEDURE — 70553 MRI BRAIN STEM W/O & W/DYE: CPT

## 2024-01-03 PROCEDURE — A9585: CPT

## 2024-01-03 PROCEDURE — 70553 MRI BRAIN STEM W/O & W/DYE: CPT | Mod: 26

## 2024-01-03 PROCEDURE — 72156 MRI NECK SPINE W/O & W/DYE: CPT

## 2024-01-08 ENCOUNTER — TRANSCRIPTION ENCOUNTER (OUTPATIENT)
Age: 24
End: 2024-01-08

## 2024-02-11 ENCOUNTER — NON-APPOINTMENT (OUTPATIENT)
Age: 24
End: 2024-02-11

## 2024-02-12 ENCOUNTER — TRANSCRIPTION ENCOUNTER (OUTPATIENT)
Age: 24
End: 2024-02-12

## 2024-03-04 ENCOUNTER — APPOINTMENT (OUTPATIENT)
Dept: NEUROLOGY | Facility: CLINIC | Age: 24
End: 2024-03-04
Payer: COMMERCIAL

## 2024-03-04 VITALS
DIASTOLIC BLOOD PRESSURE: 75 MMHG | BODY MASS INDEX: 34.53 KG/M2 | WEIGHT: 220 LBS | HEIGHT: 67 IN | HEART RATE: 57 BPM | SYSTOLIC BLOOD PRESSURE: 122 MMHG

## 2024-03-04 PROCEDURE — 99214 OFFICE O/P EST MOD 30 MIN: CPT

## 2024-03-04 RX ORDER — AMITRIPTYLINE HYDROCHLORIDE 25 MG/1
25 TABLET, FILM COATED ORAL
Qty: 30 | Refills: 3 | Status: DISCONTINUED | COMMUNITY
Start: 2023-03-30 | End: 2024-03-04

## 2024-03-04 RX ORDER — METHYLPREDNISOLONE 4 MG/1
4 TABLET ORAL
Qty: 1 | Refills: 0 | Status: DISCONTINUED | COMMUNITY
Start: 2023-12-14 | End: 2024-03-04

## 2024-03-04 NOTE — ASSESSMENT
[FreeTextEntry1] : Assessment/Plan: 23 year old female w/ hx of migraine headaches with aura, diagnosed with relapsing MS 8/2023 (Left ON 1/2023, R face/arm numbness 4/2023 w/ new + active lesion on brain MRI, + DIT and DIS).  # Relapsing MS, dx'd 8/2023, on kesimpta since 9/2023. Clinically and radiologically stable.   Plan: 1. Diagnostic Plan/Imaging: Repeat MRI brain and C w/o contrast for radiological stability 1/2025  2. Disease Modifying therapy plan: Continue Kesimpta 20 mg SQ: Every 4 weeks Kesimpta labs every 6 months  3. Symptomatic therapy plan: [] Vitamin D3 and B12 supplementations.  # Migraine headaches with aura- since age 19. Now a lot better.  Recent neuro ophtho evaluation normal 3/27/2023. Plan:- [] Abortive therapy: sumatriptan 50 mg tablet, take 1 tablet at the onset of the headache. Can repeat dose in 2 hours if needed. Limit dose to 2 tab/day and 5 tab/week. Discussed the side effects drowsiness and nausea/diarrhea. [] Over the counter preventative therapies discussed, which include Magnesium 400 mg QD (discussed potential side effect of diarrhea), riboflavin 400 mg QD and Coenzyme Q10 100 mg daily.   Return to clinic 6 months  The above plan was discussed with LEIGHA HARDIN in great detail. LEIGHA HARDIN verbalized understanding and agrees with plan as detailed above. Patient was provided education and counselling on current diagnosis/symptoms. She was advised to call our clinic at [phone] for any new or worsening symptoms, or with any questions or concerns. LEIGHA HARDIN expressed understanding and all her questions/concerns were addressed.  Kath De Los Santos M.D.

## 2024-03-04 NOTE — DATA REVIEWED
[de-identified] : MR brain and C spine w/w/o 1/3/2024-stable.   MRI brain and C spine w/w/o contrast 7/3/2023 @ [facility] reviewed- C spine MRI normal, brain MRI w/ interval development of several new small T2 hyperintense WM lesions ( R anterior  frontal PV, Anterior R temporal, Medial R temporal) w/ ? associated punctuate enh involving a R temp PV lesion, no CC or posterior fossa involvement.  MRI's 1/2023- -  MRI brain w/w/o- single L parietal subcortical T2 hyperintense  lesion, otherwise no abnormality and no enhancement. no callosal lesion. no infratentorial lesion.  MRI orbits w/w/o-enhancement of intracanicular and intracranial segment of L optic nerve  MRI C and T spine w/w/o- normal cord.  [de-identified] : Serum NMO and MOG antibody negative 1/2023.

## 2024-03-04 NOTE — HISTORY OF PRESENT ILLNESS
[FreeTextEntry1] : INTERIM HX 03/04/2024: MR brain and C spine w/w/o 1/3/2024-stable. She is doing well. No new symptoms. Continues on kesimpta, tolerating it okay. Cannot remember when she last had a migraine, never started migraine meds.  Feels forgetful. Had a recent sleep study- does not have results yet.   INTERIM HX 12/14/2023: On kesimpta since 9/5/2023. Felt sick after first dose. HA are now daily headaches in the last 2 months. Never started amitriptyline.   INTERIM HX 08/22/2023: JCV ab + 3.42. Vitamin D 28.4. Quant Tb neg, LFT normal. HEp B panel neg. CBC normal. VZV IgG +. Reviewed DMT options at this visit. Here with mother. Answered all questions. Pt stable.   INTERIM HX 08/10/2023: MRI brain and C spine w/w/o contrast 7/3/2023 @ [facility] reviewed- C spine MRI normal, brain MRI w/ interval development of several new small T2 hyperintense WM lesions ( R anterior  frontal PV, Anterior R temporal, Medial R temporal) w/ ? associated punctuate enh involving a R temp PV lesion, no CC or posterior fossa involvement.  She remains stable. No new symptoms.   INTERIM HX 05/05/2023:  2.5 weeks ago had numbness over Right half of face, neck and entire R arm, no tingling, RUE felt heavy, no facial droop, got worse over a few days, now resolved.  She is left handed. Bad migraine 3-4 x /week. Did not take sumatriptan or nortriptyline. Started supplements. [place] trip in 2 weeks.  INTERIM HX 03/30/2023: Serum NMO and MOG antibody negative 1/2023. Seen by Dr Montgomery, 2/6, VA 20/20 OD and 20/25 OS with PH, trace RAPD OS, no disc edema, normal OCT, "non specific" HVF defect OS.  Recently seen by Dr Montgomery 3/27 for pain in R eye ("swollen and tight") with blurry vision x 1.5 weeks, associated with irritation, VA 20/20 OU, trace APD OS, normal fundus exam, HVF and OCT normal, no evidence of optic neuritis in R eye. Current symptom: pressure around the right eye, intermittent blurry vision (lasts 5 min-30 min). strain over the right side of the head. No clear triggers. possibly some photophobia, happens more at work. Had a few migraines in the last week.  Migraine freq:  4 x /week. Also gets tension headaches. Sleep is good.   -------------------------------------------------------------------------------------------------------------------------------------------------------------------------------- HPI (initial visit Jan 26, 2023)- LEIGHA HARDIN is a 22 year old woman recently admitted to [facility] 1/18-1/22/2023 for L optic neuritis, s/p 5 days IV solumedrol with improvement in symptoms.  Her symptoms began 1/1/2023, with pain on moving L eye. This was followed by cloudiness over  L eye- like a film over eye. Of note, she also tested positive for COVID around the same time. She was seen by ophthalmologist, Dr. Nrei Patiño, who recommended she present to ED for optic neuritis, given enhancement of intracanicular and intracranial segment of L optic nerve on outside MRI scan. She also had an outside brain MRI which showed a single L parietal subcortical T2 hyperintense  lesion, otherwise no abnormality and no enhancement. MRI C and T spine did not show any cord signal abnormality. She completed 5 days of IV solumedrol with symptom improvement. She was discharged home on prednisone taper (60 mg QD, taper down by 10 mg daily).   Labs in hospital reviewed- serum NMO and MOG ab pending.  ESR 3, CRP < 3. HbA1c 5.4%, TSH normal. B12 692. VitD 31. SSA/SSB, DsDNA, Joyce-1 ab, Sm/RNP ab, RF, ANCA WNL FLAKITO 1:80, speckled  Vision is 20-30% better. She is still on prednisone 30 mg QD. Pain with eye movements has resolved. 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   Hx of COVID-19 infection 12/2021.  She has a hx of migraine headaches-since 19 years of age, can be one sided. "hurt so bad". no vision changes. + photophobia. no phonophobia. no nausea. no dizziness. can last a day. no triggers. Also has tension type headache.   no family Ms or autoimmune disease.

## 2024-03-04 NOTE — PHYSICAL EXAM
[FreeTextEntry1] : PHYSICAL EXAM Constitutional: Alert, no acute distress  Psychiatric: appropriate affect and mood Pulmonary: No respiratory distress, stable on room air  NEUROLOGICAL EXAM Mental status: The patient is alert, attentive and conversational memory intact Speech/language: No dysarthria Cranial nerves: CN II: PERRL, No RAPD. VFC intact/full. CN III, IV, VI: EOMI, no nystagmus, no ptosis. Jerky pursuit CN V: Facial sensation is intact to pinprick in all 3 divisions bilaterally. CN VII: Face is symmetric with normal eye closure and smile. CN VII: Hearing is normal to rubbing fingers CN IX, X: Palate elevates symmetrically. Phonation is normal. CN XI: Head turning and shoulder shrug are intact CN XII: Tongue is midline with normal movements and no atrophy. Motor: Strength is full bilaterally. 5/5 muscle power in bilateral UE and LE. Reflexes: R L  Biceps 2+ 2+  Patellar 2+ 2+  Achilles 2+ 2+  Plantar responses-  R down, L down Sensory: Intact sensations to LT/PP/VIB in UE and LE.  Coordination: There is no dysmetria on finger-to-nose and heel to shin. .  Gait/Stance: Posture is normal. Gait is steady with normal steps, base, arm swing, and turning. Heel and toe walking are normal. Tandem gait is normal. Romberg is absent.

## 2024-05-05 ENCOUNTER — NON-APPOINTMENT (OUTPATIENT)
Age: 24
End: 2024-05-05

## 2024-05-06 ENCOUNTER — APPOINTMENT (OUTPATIENT)
Dept: NEUROLOGY | Facility: CLINIC | Age: 24
End: 2024-05-06

## 2024-05-06 VITALS
HEIGHT: 67 IN | DIASTOLIC BLOOD PRESSURE: 82 MMHG | WEIGHT: 230 LBS | BODY MASS INDEX: 36.1 KG/M2 | SYSTOLIC BLOOD PRESSURE: 131 MMHG | HEART RATE: 58 BPM

## 2024-05-06 DIAGNOSIS — G35 MULTIPLE SCLEROSIS: ICD-10-CM

## 2024-05-06 DIAGNOSIS — G43.109 MIGRAINE WITH AURA, NOT INTRACTABLE, W/OUT STATUS MIGRAINOSUS: ICD-10-CM

## 2024-05-06 RX ORDER — CHOLECALCIFEROL (VITAMIN D3) 1250 MCG
1.25 MG CAPSULE ORAL
Qty: 12 | Refills: 0 | Status: DISCONTINUED | COMMUNITY
Start: 2023-08-22 | End: 2024-05-06

## 2024-05-06 NOTE — DATA REVIEWED
[de-identified] : MR brain and C spine w/w/o 1/3/2024-stable.   MRI brain and C spine w/w/o contrast 7/3/2023 @ [facility] reviewed- C spine MRI normal, brain MRI w/ interval development of several new small T2 hyperintense WM lesions ( R anterior  frontal PV, Anterior R temporal, Medial R temporal) w/ ? associated punctuate enh involving a R temp PV lesion, no CC or posterior fossa involvement.  MRI's 1/2023- -  MRI brain w/w/o- single L parietal subcortical T2 hyperintense  lesion, otherwise no abnormality and no enhancement. no callosal lesion. no infratentorial lesion.  MRI orbits w/w/o-enhancement of intracanicular and intracranial segment of L optic nerve  MRI C and T spine w/w/o- normal cord.  [de-identified] : Serum NMO and MOG antibody negative 1/2023.

## 2024-05-06 NOTE — PHYSICAL EXAM
[FreeTextEntry1] : PHYSICAL EXAM Constitutional: Alert, no acute distress Psychiatric: appropriate affect and mood Pulmonary: No respiratory distress, stable on room air  NEUROLOGICAL EXAM Mental status: The patient is alert, attentive and conversational memory intact Speech/language: No dysarthria Cranial nerves: CN II: PERRL, No RAPD. VFC intact/full. CN III, IV, VI: EOMI, no nystagmus, no ptosis. Jerky pursuit CN V: Facial sensation is intact to pinprick in all 3 divisions bilaterally. CN VII: Face is symmetric with normal eye closure and smile. CN VII: Hearing is normal to rubbing fingers CN IX, X: Palate elevates symmetrically. Phonation is normal. CN XI: Head turning and shoulder shrug are intact CN XII: Tongue is midline with normal movements and no atrophy. Motor: Strength is full bilaterally. 5/5 muscle power in bilateral UE and LE. Reflexes: R L  Biceps 2+ 2+  Patellar 2+ 2+  Achilles 2+ 2+  Plantar responses- R down, L down Sensory: Intact sensations to LT/PP/VIB in UE and LE. Coordination: There is no dysmetria on finger-to-nose and heel to shin.. Gait/Stance: Posture is normal. Gait is steady with normal steps, base, arm swing, and turning. Heel and toe walking are normal. Tandem gait is normal. Romberg is absent.

## 2024-05-06 NOTE — ASSESSMENT
[FreeTextEntry1] : Assessment/Plan: 23 year old female w/ hx of migraine headaches with aura, diagnosed with relapsing MS 8/2023 (Left ON 1/2023, R face/arm numbness 4/2023 w/ new + active lesion on brain MRI, + DIT and DIS).  # Relapsing MS, dx'd 8/2023, on kesimpta since 9/2023. Clinically and radiologically stable.  Plan: 1. Diagnostic Plan/Imaging: Repeat MRI brain and C w/o contrast for radiological stability 1/2025  2. Disease Modifying therapy plan: Continue Kesimpta 20 mg SQ: Every 4 weeks Kesimpta labs every 6 months  3. Symptomatic therapy plan: [] Vitamin D3 and B12 supplementations.  # Migraine headaches with aura- since age 19. Now a lot better. Recent neuro ophtho evaluation normal 3/27/2023. Plan:- [] Abortive therapy: sumatriptan 50 mg tablet, take 1 tablet at the onset of the headache. Can repeat dose in 2 hours if needed. Limit dose to 2 tab/day and 5 tab/week. Discussed the side effects drowsiness and nausea/diarrhea. [] Over the counter preventative therapies discussed, which include Magnesium 400 mg QD (discussed potential side effect of diarrhea), riboflavin 400 mg QD and Coenzyme Q10 100 mg daily.   Return to clinic 6 months  The above plan was discussed with LEIGHA HARDIN in great detail. LEIGHA HARDIN verbalized understanding and agrees with plan as detailed above. Patient was provided education and counselling on current diagnosis/symptoms. She was advised to call our clinic at [phone] for any new or worsening symptoms, or with any questions or concerns. LEIGHA HARDIN expressed understanding and all her questions/concerns were addressed.  Kath De Los Santos M.D.

## 2024-05-06 NOTE — HISTORY OF PRESENT ILLNESS
[FreeTextEntry1] : INTERIM HX 05/06/2024:  INTERIM HX 03/04/2024: MR brain and C spine w/w/o 1/3/2024-stable. She is doing well. No new symptoms. Continues on kesimpta, tolerating it okay. Cannot remember when she last had a migraine, never started migraine meds.  Feels forgetful. Had a recent sleep study- does not have results yet.   INTERIM HX 12/14/2023: On kesimpta since 9/5/2023. Felt sick after first dose. HA are now daily headaches in the last 2 months. Never started amitriptyline.   INTERIM HX 08/22/2023: JCV ab + 3.42. Vitamin D 28.4. Quant Tb neg, LFT normal. HEp B panel neg. CBC normal. VZV IgG +. Reviewed DMT options at this visit. Here with mother. Answered all questions. Pt stable.   INTERIM HX 08/10/2023: MRI brain and C spine w/w/o contrast 7/3/2023 @ [facility] reviewed- C spine MRI normal, brain MRI w/ interval development of several new small T2 hyperintense WM lesions ( R anterior  frontal PV, Anterior R temporal, Medial R temporal) w/ ? associated punctuate enh involving a R temp PV lesion, no CC or posterior fossa involvement.  She remains stable. No new symptoms.   INTERIM HX 05/05/2023:  2.5 weeks ago had numbness over Right half of face, neck and entire R arm, no tingling, RUE felt heavy, no facial droop, got worse over a few days, now resolved.  She is left handed. Bad migraine 3-4 x /week. Did not take sumatriptan or nortriptyline. Started supplements. [place] trip in 2 weeks.  INTERIM HX 03/30/2023: Serum NMO and MOG antibody negative 1/2023. Seen by Dr Montgomery, 2/6, VA 20/20 OD and 20/25 OS with PH, trace RAPD OS, no disc edema, normal OCT, "non specific" HVF defect OS.  Recently seen by Dr Montgomery 3/27 for pain in R eye ("swollen and tight") with blurry vision x 1.5 weeks, associated with irritation, VA 20/20 OU, trace APD OS, normal fundus exam, HVF and OCT normal, no evidence of optic neuritis in R eye. Current symptom: pressure around the right eye, intermittent blurry vision (lasts 5 min-30 min). strain over the right side of the head. No clear triggers. possibly some photophobia, happens more at work. Had a few migraines in the last week.  Migraine freq:  4 x /week. Also gets tension headaches. Sleep is good.   -------------------------------------------------------------------------------------------------------------------------------------------------------------------------------- HPI (initial visit Jan 26, 2023)- LEIGHA HARDIN is a 22 year old woman recently admitted to [facility] 1/18-1/22/2023 for L optic neuritis, s/p 5 days IV solumedrol with improvement in symptoms.  Her symptoms began 1/1/2023, with pain on moving L eye. This was followed by cloudiness over  L eye- like a film over eye. Of note, she also tested positive for COVID around the same time. She was seen by ophthalmologist, Dr. Neri Patiño, who recommended she present to ED for optic neuritis, given enhancement of intracanicular and intracranial segment of L optic nerve on outside MRI scan. She also had an outside brain MRI which showed a single L parietal subcortical T2 hyperintense  lesion, otherwise no abnormality and no enhancement. MRI C and T spine did not show any cord signal abnormality. She completed 5 days of IV solumedrol with symptom improvement. She was discharged home on prednisone taper (60 mg QD, taper down by 10 mg daily).   Labs in hospital reviewed- serum NMO and MOG ab pending.  ESR 3, CRP < 3. HbA1c 5.4%, TSH normal. B12 692. VitD 31. SSA/SSB, DsDNA, Joyce-1 ab, Sm/RNP ab, RF, ANCA WNL FLAKITO 1:80, speckled  Vision is 20-30% better. She is still on prednisone 30 mg QD. Pain with eye movements has resolved. 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   Hx of COVID-19 infection 12/2021.  She has a hx of migraine headaches-since 19 years of age, can be one sided. "hurt so bad". no vision changes. + photophobia. no phonophobia. no nausea. no dizziness. can last a day. no triggers. Also has tension type headache.   no family Ms or autoimmune disease.

## 2024-06-19 ENCOUNTER — TRANSCRIPTION ENCOUNTER (OUTPATIENT)
Age: 24
End: 2024-06-19

## 2024-07-16 ENCOUNTER — TRANSCRIPTION ENCOUNTER (OUTPATIENT)
Age: 24
End: 2024-07-16

## 2024-07-17 ENCOUNTER — TRANSCRIPTION ENCOUNTER (OUTPATIENT)
Age: 24
End: 2024-07-17

## 2024-07-19 ENCOUNTER — TRANSCRIPTION ENCOUNTER (OUTPATIENT)
Age: 24
End: 2024-07-19

## 2024-07-19 ENCOUNTER — NON-APPOINTMENT (OUTPATIENT)
Age: 24
End: 2024-07-19

## 2024-09-05 ENCOUNTER — NON-APPOINTMENT (OUTPATIENT)
Age: 24
End: 2024-09-05

## 2024-09-05 ENCOUNTER — RX RENEWAL (OUTPATIENT)
Age: 24
End: 2024-09-05

## 2024-09-12 ENCOUNTER — APPOINTMENT (OUTPATIENT)
Dept: NEUROLOGY | Facility: CLINIC | Age: 24
End: 2024-09-12
Payer: COMMERCIAL

## 2024-09-12 VITALS
HEART RATE: 74 BPM | DIASTOLIC BLOOD PRESSURE: 77 MMHG | SYSTOLIC BLOOD PRESSURE: 113 MMHG | WEIGHT: 230 LBS | HEIGHT: 67 IN | BODY MASS INDEX: 36.1 KG/M2

## 2024-09-12 DIAGNOSIS — G43.109 MIGRAINE WITH AURA, NOT INTRACTABLE, W/OUT STATUS MIGRAINOSUS: ICD-10-CM

## 2024-09-12 DIAGNOSIS — G35 MULTIPLE SCLEROSIS: ICD-10-CM

## 2024-09-12 PROCEDURE — 99214 OFFICE O/P EST MOD 30 MIN: CPT

## 2024-09-12 PROCEDURE — G2211 COMPLEX E/M VISIT ADD ON: CPT | Mod: NC

## 2024-09-12 NOTE — DATA REVIEWED
[de-identified] : MR brain and C spine w/w/o 1/3/2024-stable.   MRI brain and C spine w/w/o contrast 7/3/2023 @ [facility] reviewed- C spine MRI normal, brain MRI w/ interval development of several new small T2 hyperintense WM lesions ( R anterior  frontal PV, Anterior R temporal, Medial R temporal) w/ ? associated punctuate enh involving a R temp PV lesion, no CC or posterior fossa involvement.  MRI's 1/2023- -  MRI brain w/w/o- single L parietal subcortical T2 hyperintense  lesion, otherwise no abnormality and no enhancement. no callosal lesion. no infratentorial lesion.  MRI orbits w/w/o-enhancement of intracanicular and intracranial segment of L optic nerve  MRI C and T spine w/w/o- normal cord.  [de-identified] : Serum NMO and MOG antibody negative 1/2023.

## 2024-09-12 NOTE — HISTORY OF PRESENT ILLNESS
[FreeTextEntry1] : INTERIM HX 09/12/2024: She is doing well. Just got back from a [place] Trip this past weekend. Earlier this week she felt 2 days of nausea and motion sickness, no headaches. Does not recall the last time she had a headache.   INTERIM HX 03/04/2024: MR brain and C spine w/w/o 1/3/2024-stable. She is doing well. No new symptoms. Continues on kesimpta, tolerating it okay. Cannot remember when she last had a migraine, never started migraine meds.  Feels forgetful. Had a recent sleep study- does not have results yet.   INTERIM HX 12/14/2023: On kesimpta since 9/5/2023. Felt sick after first dose. HA are now daily headaches in the last 2 months. Never started amitriptyline.   INTERIM HX 08/22/2023: JCV ab + 3.42. Vitamin D 28.4. Quant Tb neg, LFT normal. HEp B panel neg. CBC normal. VZV IgG +. Reviewed DMT options at this visit. Here with mother. Answered all questions. Pt stable.   INTERIM HX 08/10/2023: MRI brain and C spine w/w/o contrast 7/3/2023 @ [facility] reviewed- C spine MRI normal, brain MRI w/ interval development of several new small T2 hyperintense WM lesions ( R anterior  frontal PV, Anterior R temporal, Medial R temporal) w/ ? associated punctuate enh involving a R temp PV lesion, no CC or posterior fossa involvement.  She remains stable. No new symptoms.   INTERIM HX 05/05/2023:  2.5 weeks ago had numbness over Right half of face, neck and entire R arm, no tingling, RUE felt heavy, no facial droop, got worse over a few days, now resolved.  She is left handed. Bad migraine 3-4 x /week. Did not take sumatriptan or nortriptyline. Started supplements. [place] trip in 2 weeks.  INTERIM HX 03/30/2023: Serum NMO and MOG antibody negative 1/2023. Seen by Dr Montgomery, 2/6, VA 20/20 OD and 20/25 OS with PH, trace RAPD OS, no disc edema, normal OCT, "non specific" HVF defect OS.  Recently seen by Dr Montgomery 3/27 for pain in R eye ("swollen and tight") with blurry vision x 1.5 weeks, associated with irritation, VA 20/20 OU, trace APD OS, normal fundus exam, HVF and OCT normal, no evidence of optic neuritis in R eye. Current symptom: pressure around the right eye, intermittent blurry vision (lasts 5 min-30 min). strain over the right side of the head. No clear triggers. possibly some photophobia, happens more at work. Had a few migraines in the last week.  Migraine freq:  4 x /week. Also gets tension headaches. Sleep is good.   -------------------------------------------------------------------------------------------------------------------------------------------------------------------------------- HPI (initial visit Jan 26, 2023)- LEIGHA HARDIN is a 22 year old woman recently admitted to [facility] 1/18-1/22/2023 for L optic neuritis, s/p 5 days IV solumedrol with improvement in symptoms.  Her symptoms began 1/1/2023, with pain on moving L eye. This was followed by cloudiness over  L eye- like a film over eye. Of note, she also tested positive for COVID around the same time. She was seen by ophthalmologist, Dr. Neri Patiño, who recommended she present to ED for optic neuritis, given enhancement of intracanicular and intracranial segment of L optic nerve on outside MRI scan. She also had an outside brain MRI which showed a single L parietal subcortical T2 hyperintense  lesion, otherwise no abnormality and no enhancement. MRI C and T spine did not show any cord signal abnormality. She completed 5 days of IV solumedrol with symptom improvement. She was discharged home on prednisone taper (60 mg QD, taper down by 10 mg daily).   Labs in hospital reviewed- serum NMO and MOG ab pending.  ESR 3, CRP < 3. HbA1c 5.4%, TSH normal. B12 692. VitD 31. SSA/SSB, DsDNA, Joyce-1 ab, Sm/RNP ab, RF, ANCA WNL FLAKITO 1:80, speckled  Vision is 20-30% better. She is still on prednisone 30 mg QD. Pain with eye movements has resolved. 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   Hx of COVID-19 infection 12/2021.  She has a hx of migraine headaches-since 19 years of age, can be one sided. "hurt so bad". no vision changes. + photophobia. no phonophobia. no nausea. no dizziness. can last a day. no triggers. Also has tension type headache.   no family Ms or autoimmune disease.

## 2024-09-12 NOTE — PHYSICAL EXAM
[FreeTextEntry1] : EOMI, no nystagmus, no ptosis.  Face is symmetric with normal eye closure and smile. Motor: Strength is full bilaterally. 5/5 muscle power in bilateral UE and LE. Gait is steady

## 2024-09-12 NOTE — ASSESSMENT
[FreeTextEntry1] : Assessment/Plan: 24 year old female w/ hx of migraine headaches with aura, diagnosed with relapsing MS 8/2023 (Left ON 1/2023, R face/arm numbness 4/2023 w/ new + active lesion on brain MRI, + DIT and DIS).  # Relapsing MS, dx'd 8/2023, on kesimpta since 9/2023. Clinically and radiologically stable.  Plan: 1. Diagnostic Plan/Imaging: Repeat MRI brain and C w/o contrast for radiological stability 2/2025  2. Disease Modifying therapy plan: Continue Kesimpta 20 mg SQ every 4 weeks Kesimpta labs every 6 months  3. Symptomatic therapy plan: [] Vitamin D3 and B12 supplementations.  # Migraine headaches with aura- since age 19.  +/- vestibular migraines. Recent neuro ophtho evaluation normal 3/27/2023. Plan:- [] Abortive therapy: sumatriptan 50 mg tablet, take 1 tablet at the onset of the headache. Can repeat dose in 2 hours if needed. Discussed the side effects drowsiness and nausea/diarrhea. [] Over the counter preventative therapies discussed, which include Magnesium 400 mg QD (discussed potential side effect of diarrhea), riboflavin 400 mg QD and Coenzyme Q10 100 mg daily.   Return to clinic 6 months  The above plan was discussed with LEIGHA HARDIN in great detail. LEIGHA HARDIN verbalized understanding and agrees with plan as detailed above. Patient was provided education and counselling on current diagnosis/symptoms. She was advised to call our clinic at [phone] for any new or worsening symptoms, or with any questions or concerns. LEIGHA HARDIN expressed understanding and all her questions/concerns were addressed.  Kath De Los Santos M.D.

## 2024-10-28 ENCOUNTER — TRANSCRIPTION ENCOUNTER (OUTPATIENT)
Age: 24
End: 2024-10-28

## 2024-12-02 ENCOUNTER — TRANSCRIPTION ENCOUNTER (OUTPATIENT)
Age: 24
End: 2024-12-02

## 2025-01-16 ENCOUNTER — TRANSCRIPTION ENCOUNTER (OUTPATIENT)
Age: 25
End: 2025-01-16

## 2025-01-23 ENCOUNTER — TRANSCRIPTION ENCOUNTER (OUTPATIENT)
Age: 25
End: 2025-01-23

## 2025-03-06 ENCOUNTER — APPOINTMENT (OUTPATIENT)
Dept: NEUROLOGY | Facility: CLINIC | Age: 25
End: 2025-03-06
Payer: COMMERCIAL

## 2025-03-06 ENCOUNTER — NON-APPOINTMENT (OUTPATIENT)
Age: 25
End: 2025-03-06

## 2025-03-06 VITALS
BODY MASS INDEX: 32.49 KG/M2 | HEART RATE: 72 BPM | SYSTOLIC BLOOD PRESSURE: 125 MMHG | HEIGHT: 67 IN | DIASTOLIC BLOOD PRESSURE: 77 MMHG | WEIGHT: 207 LBS

## 2025-03-06 DIAGNOSIS — G35 MULTIPLE SCLEROSIS: ICD-10-CM

## 2025-03-06 DIAGNOSIS — G43.109 MIGRAINE WITH AURA, NOT INTRACTABLE, W/OUT STATUS MIGRAINOSUS: ICD-10-CM

## 2025-03-06 PROCEDURE — G2211 COMPLEX E/M VISIT ADD ON: CPT | Mod: NC

## 2025-03-06 PROCEDURE — 99214 OFFICE O/P EST MOD 30 MIN: CPT

## 2025-03-14 ENCOUNTER — APPOINTMENT (OUTPATIENT)
Dept: MRI IMAGING | Facility: CLINIC | Age: 25
End: 2025-03-14
Payer: COMMERCIAL

## 2025-03-14 ENCOUNTER — OUTPATIENT (OUTPATIENT)
Dept: OUTPATIENT SERVICES | Facility: HOSPITAL | Age: 25
LOS: 1 days | End: 2025-03-14
Payer: COMMERCIAL

## 2025-03-14 DIAGNOSIS — G35 MULTIPLE SCLEROSIS: ICD-10-CM

## 2025-03-14 PROCEDURE — 76377 3D RENDER W/INTRP POSTPROCES: CPT | Mod: 26

## 2025-03-14 PROCEDURE — 72141 MRI NECK SPINE W/O DYE: CPT | Mod: 26

## 2025-03-14 PROCEDURE — 70551 MRI BRAIN STEM W/O DYE: CPT | Mod: 26

## 2025-03-14 PROCEDURE — 72146 MRI CHEST SPINE W/O DYE: CPT | Mod: 26

## 2025-03-14 PROCEDURE — 70551 MRI BRAIN STEM W/O DYE: CPT

## 2025-03-14 PROCEDURE — 76377 3D RENDER W/INTRP POSTPROCES: CPT

## 2025-03-14 PROCEDURE — 72141 MRI NECK SPINE W/O DYE: CPT

## 2025-03-14 PROCEDURE — 72146 MRI CHEST SPINE W/O DYE: CPT

## 2025-03-17 ENCOUNTER — TRANSCRIPTION ENCOUNTER (OUTPATIENT)
Age: 25
End: 2025-03-17

## 2025-03-18 DIAGNOSIS — N93.9 ABNORMAL UTERINE AND VAGINAL BLEEDING, UNSPECIFIED: ICD-10-CM

## 2025-03-19 ENCOUNTER — APPOINTMENT (OUTPATIENT)
Dept: OBGYN | Facility: CLINIC | Age: 25
End: 2025-03-19
Payer: COMMERCIAL

## 2025-03-19 ENCOUNTER — ASOB RESULT (OUTPATIENT)
Age: 25
End: 2025-03-19

## 2025-03-19 VITALS
HEART RATE: 71 BPM | SYSTOLIC BLOOD PRESSURE: 124 MMHG | DIASTOLIC BLOOD PRESSURE: 81 MMHG | BODY MASS INDEX: 33.27 KG/M2 | HEIGHT: 67 IN | WEIGHT: 212 LBS

## 2025-03-19 DIAGNOSIS — N93.9 ABNORMAL UTERINE AND VAGINAL BLEEDING, UNSPECIFIED: ICD-10-CM

## 2025-03-19 DIAGNOSIS — Z30.011 ENCOUNTER FOR INITIAL PRESCRIPTION OF CONTRACEPTIVE PILLS: ICD-10-CM

## 2025-03-19 PROCEDURE — 99214 OFFICE O/P EST MOD 30 MIN: CPT

## 2025-03-19 PROCEDURE — 99459 PELVIC EXAMINATION: CPT

## 2025-03-19 PROCEDURE — 76830 TRANSVAGINAL US NON-OB: CPT

## 2025-03-20 LAB
ANTI-MUELLERIAN HORMONE: 7.41 NG/ML
ESTIMATED AVERAGE GLUCOSE: 103 MG/DL
ESTRADIOL SERPL-MCNC: 46 PG/ML
FSH SERPL-MCNC: 7.6 IU/L
HBA1C MFR BLD HPLC: 5.2 %
HCG SERPL-MCNC: <1 MIU/ML
HCT VFR BLD CALC: 40 %
HGB BLD-MCNC: 13.3 G/DL
INSULIN SERPL-MCNC: 31.2 UU/ML
LH SERPL-ACNC: 15.4 IU/L
MCHC RBC-ENTMCNC: 30.9 PG
MCHC RBC-ENTMCNC: 33.3 G/DL
MCV RBC AUTO: 92.8 FL
PLATELET # BLD AUTO: 254 K/UL
PROLACTIN SERPL-MCNC: 14.9 NG/ML
RBC # BLD: 4.31 M/UL
RBC # FLD: 12 %
TSH SERPL-ACNC: 1.91 UIU/ML
WBC # FLD AUTO: 6.57 K/UL

## 2025-03-21 LAB
TESTOST FREE SERPL-MCNC: 2 PG/ML
TESTOST SERPL-MCNC: 32.4 NG/DL

## 2025-03-24 ENCOUNTER — TRANSCRIPTION ENCOUNTER (OUTPATIENT)
Age: 25
End: 2025-03-24

## 2025-03-26 ENCOUNTER — APPOINTMENT (OUTPATIENT)
Dept: OBGYN | Facility: CLINIC | Age: 25
End: 2025-03-26
Payer: COMMERCIAL

## 2025-03-26 VITALS
WEIGHT: 213 LBS | HEIGHT: 67 IN | BODY MASS INDEX: 33.43 KG/M2 | HEART RATE: 70 BPM | SYSTOLIC BLOOD PRESSURE: 132 MMHG | DIASTOLIC BLOOD PRESSURE: 80 MMHG

## 2025-03-26 PROCEDURE — 99459 PELVIC EXAMINATION: CPT

## 2025-03-26 PROCEDURE — G2211 COMPLEX E/M VISIT ADD ON: CPT | Mod: NC

## 2025-03-26 PROCEDURE — 99214 OFFICE O/P EST MOD 30 MIN: CPT

## 2025-03-27 ENCOUNTER — TRANSCRIPTION ENCOUNTER (OUTPATIENT)
Age: 25
End: 2025-03-27

## 2025-03-29 LAB — DHEA-SULFATE, SERUM: 239 UG/DL

## 2025-08-12 ENCOUNTER — RX RENEWAL (OUTPATIENT)
Age: 25
End: 2025-08-12